# Patient Record
Sex: FEMALE | Race: WHITE | NOT HISPANIC OR LATINO | Employment: UNEMPLOYED | URBAN - METROPOLITAN AREA
[De-identification: names, ages, dates, MRNs, and addresses within clinical notes are randomized per-mention and may not be internally consistent; named-entity substitution may affect disease eponyms.]

---

## 2016-09-13 LAB
EXTERNAL ABO GROUPING: NORMAL
EXTERNAL ANTIBODY SCREEN: NEGATIVE
EXTERNAL HEPATITIS B SURFACE ANTIGEN: NEGATIVE
EXTERNAL RH FACTOR: POSITIVE
EXTERNAL RUBELLA QUALITATIVE: NORMAL
EXTERNAL SYPHILIS RPR SCREEN: NORMAL
HIV1 AB SPEC QL IA.RAPID: NEGATIVE

## 2017-01-04 ENCOUNTER — ROUTINE PRENATAL (OUTPATIENT)
Dept: OBSTETRICS AND GYNECOLOGY | Age: 37
End: 2017-01-04

## 2017-01-04 VITALS — DIASTOLIC BLOOD PRESSURE: 66 MMHG | SYSTOLIC BLOOD PRESSURE: 108 MMHG | WEIGHT: 177 LBS

## 2017-01-04 DIAGNOSIS — Z34.82 PRENATAL CARE, SUBSEQUENT PREGNANCY, SECOND TRIMESTER: Primary | ICD-10-CM

## 2017-01-04 LAB
GLUCOSE 1H P 50 G GLC PO SERPL-MCNC: 106 MG/DL (ref 65–139)
HGB BLD-MCNC: 12.1 G/DL (ref 11.9–15.5)
T4 FREE SERPL-MCNC: 0.9 NG/DL (ref 0.93–1.7)
TSH SERPL DL<=0.005 MIU/L-ACNC: 1.59 MIU/ML (ref 0.27–4.2)

## 2017-01-04 PROCEDURE — 90715 TDAP VACCINE 7 YRS/> IM: CPT | Performed by: OBSTETRICS & GYNECOLOGY

## 2017-01-04 PROCEDURE — 0502F SUBSEQUENT PRENATAL CARE: CPT | Performed by: OBSTETRICS & GYNECOLOGY

## 2017-01-04 PROCEDURE — 90471 IMMUNIZATION ADMIN: CPT | Performed by: OBSTETRICS & GYNECOLOGY

## 2017-01-04 NOTE — MR AVS SNAPSHOT
Anupama Arnold   2017 8:55 AM   Routine Prenatal    Dept Phone:  [phone]   Encounter #:  [id number]    Provider:  Jimmy Granados MD   Department:  [facility]                Your Full Care Plan              Your Updated Medication List          This list is accurate as of: 17 10:06 AM.  Always use your most recent med list.                doxylamine-pyridoxine 10-10 MG tablet delayed-release EC tablet   Commonly known as:  DICLEGIS   Take 2 tablets by mouth At Night As Needed (nausea). Can take 1 in am and at lunch prn also       prenatal vitamin 27-0.8 27-0.8 MG tablet tablet               We Performed the Following     Gestational Screen 1 Hr ([facility])     Hemoglobin     T4, Free     TSH       You Were Diagnosed With        Codes Comments    Prenatal care, subsequent pregnancy, second trimester    -  Primary ICD-10-CM: Z34.82  ICD-9-CM: V22.1       Instructions     None    Patient Instructions History      Upcoming Appointments     Visit Type Date Time Department    OB FOLLOWUP 2017  8:55 AM [facility]      [employer] Signup     [facility] [employer] allows you to send messages to your doctor, view your test results, renew your prescriptions, schedule appointments, and more. To sign up, go to [employer] and click on the Sign Up Now link in the New User? box. Enter your [employer] Activation Code exactly as it appears below along with the last four digits of your Social Security Number and your Date of Birth () to complete the sign-up process. If you do not sign up before the expiration date, you must request a new code.    [employer] Activation Code: [email or web address]  Expires: 2017 10:06 AM    If you have questions, you can email [email or web address] or call [phone] to talk to our [employer] staff. Remember, [employer] is NOT to be used for urgent needs. For medical emergencies, dial 911.               Other Info from  Your Visit           Other Notes About Your Plan     Advanced maternal age inform us sequence DNA within normal limits XX    [employer] insurance out of pocket    Sulfa allergy    Previous prematurity  on first pregnancy not in last 2    Previous large for gestational age        Allergies     Sulfa Antibiotics        Reason for Visit     Routine Prenatal Visit 1 hr gtt      Vital Signs     Blood Pressure Weight Last Menstrual Period Smoking Status          108/66 177 lb (80.3 kg) 07/20/2016 Never Smoker        Problems and Diagnoses Noted     Prenatal care    -  Primary

## 2017-01-04 NOTE — PROGRESS NOTES
[place] hemoglobin free T4 today.  Some stress with patient's mother but otherwise she is doing well

## 2017-01-05 ENCOUNTER — TELEPHONE (OUTPATIENT)
Dept: OBSTETRICS AND GYNECOLOGY | Age: 37
End: 2017-01-05

## 2017-01-05 NOTE — TELEPHONE ENCOUNTER
----- Message from Jimmy Granados MD sent at 1/5/2017 11:42 AM EST -----  These notify glucose, hemoglobin, and thyroid stimulating hormone were normal

## 2017-01-31 ENCOUNTER — TELEPHONE (OUTPATIENT)
Dept: OBSTETRICS AND GYNECOLOGY | Age: 37
End: 2017-01-31

## 2017-01-31 NOTE — TELEPHONE ENCOUNTER
Dr JACKSON pt, pt is 28 weeks preg. She has been having a normal white discharge, no smell, etc. Pt noticed last night when she was in the shower that she was having a mucus like discharge. But pt stated that she was pulling it out of her. She thinks it is her mucus plug. This is her 4th child and she has an appt tomorrow. Pt would like to know if she can just come in for her appt tomorrow. Please advise.     Pt#: [phone]   Message per [place]ita

## 2017-01-31 NOTE — TELEPHONE ENCOUNTER
Tomorrow should be fine.  Call back if she is having profuse amount of d/c (like her water broke), if there is bleeding or if she is having contraction.  Rest if able to today and push fluids.

## 2017-02-01 ENCOUNTER — ROUTINE PRENATAL (OUTPATIENT)
Dept: OBSTETRICS AND GYNECOLOGY | Age: 37
End: 2017-02-01

## 2017-02-01 VITALS — SYSTOLIC BLOOD PRESSURE: 130 MMHG | DIASTOLIC BLOOD PRESSURE: 76 MMHG | WEIGHT: 188 LBS

## 2017-02-01 DIAGNOSIS — N76.0 ACUTE VAGINITIS: Primary | ICD-10-CM

## 2017-02-01 PROCEDURE — 0502F SUBSEQUENT PRENATAL CARE: CPT | Performed by: OBSTETRICS & GYNECOLOGY

## 2017-02-01 RX ORDER — FEXOFENADINE HCL AND PSEUDOEPHEDRINE HCI 180; 240 MG/1; MG/1
1 TABLET, EXTENDED RELEASE ORAL
COMMUNITY

## 2017-02-01 RX ORDER — SALICYLIC ACID 2 %
CLEANSER (ML) TOPICAL
Refills: 11 | COMMUNITY
Start: 2016-12-27

## 2017-02-01 NOTE — PROGRESS NOTES
Reviewed glucose hemoglobin thyroid.  Cervix long thick and closed given past prematurity delivery checked a 1 swab culture plus  Recommend checking cervix next visit

## 2017-02-03 ENCOUNTER — APPOINTMENT (OUTPATIENT)
Dept: ULTRASOUND IMAGING | Facility: HOSPITAL | Age: 37
End: 2017-02-03

## 2017-02-03 ENCOUNTER — HOSPITAL ENCOUNTER (INPATIENT)
Facility: HOSPITAL | Age: 37
LOS: 7 days | Discharge: HOME | End: 2017-02-10
Attending: OBSTETRICS & GYNECOLOGY | Admitting: OBSTETRICS & GYNECOLOGY

## 2017-02-03 DIAGNOSIS — R50.82 POSTOPERATIVE FEVER: ICD-10-CM

## 2017-02-03 DIAGNOSIS — L03.311 CELLULITIS OF ABDOMINAL WALL: ICD-10-CM

## 2017-02-03 DIAGNOSIS — O21.9 NAUSEA AND VOMITING DURING PREGNANCY: ICD-10-CM

## 2017-02-03 DIAGNOSIS — O42.919 PRETERM PREMATURE RUPTURE OF MEMBRANES (PPROM) WITH UNKNOWN ONSET OF LABOR: Primary | ICD-10-CM

## 2017-02-03 PROBLEM — Z34.90 PREGNANCY: Status: ACTIVE | Noted: 2017-02-03

## 2017-02-03 LAB
ABO GROUP BLD: NORMAL
BASOPHILS # BLD AUTO: 0.01 10*3/MM3 (ref 0–0.2)
BASOPHILS NFR BLD AUTO: 0.1 % (ref 0–1.5)
BILIRUB UR QL STRIP: NEGATIVE
BLD GP AB SCN SERPL QL: NEGATIVE
CLARITY UR: CLEAR
CLUE CELLS SPEC QL WET PREP: ABNORMAL
COLOR UR: YELLOW
CRP SERPL-MCNC: 0.34 MG/DL (ref 0–0.5)
DEPRECATED RDW RBC AUTO: 44.9 FL (ref 37–54)
EOSINOPHIL # BLD AUTO: 0.1 10*3/MM3 (ref 0–0.7)
EOSINOPHIL NFR BLD AUTO: 1 % (ref 0.3–6.2)
ERYTHROCYTE [DISTWIDTH] IN BLOOD BY AUTOMATED COUNT: 13 % (ref 11.7–13)
GLUCOSE UR STRIP-MCNC: NEGATIVE MG/DL
HCT VFR BLD AUTO: 34.5 % (ref 35.6–45.5)
HGB BLD-MCNC: 11.5 G/DL (ref 11.9–15.5)
HGB UR QL STRIP.AUTO: NEGATIVE
HYDATID CYST SPEC WET PREP: ABNORMAL
IMM GRANULOCYTES # BLD: 0.07 10*3/MM3 (ref 0–0.03)
IMM GRANULOCYTES NFR BLD: 0.7 % (ref 0–0.5)
KETONES UR QL STRIP: NEGATIVE
LEUKOCYTE ESTERASE UR QL STRIP.AUTO: NEGATIVE
LYMPHOCYTES # BLD AUTO: 1.97 10*3/MM3 (ref 0.9–4.8)
LYMPHOCYTES NFR BLD AUTO: 19.6 % (ref 19.6–45.3)
MCH RBC QN AUTO: 32 PG (ref 26.9–32)
MCHC RBC AUTO-ENTMCNC: 33.3 G/DL (ref 32.4–36.3)
MCV RBC AUTO: 96.1 FL (ref 80.5–98.2)
MONOCYTES # BLD AUTO: 0.41 10*3/MM3 (ref 0.2–1.2)
MONOCYTES NFR BLD AUTO: 4.1 % (ref 5–12)
NEUTROPHILS # BLD AUTO: 7.49 10*3/MM3 (ref 1.9–8.1)
NEUTROPHILS NFR BLD AUTO: 74.5 % (ref 42.7–76)
NITRITE UR QL STRIP: NEGATIVE
PH UR STRIP.AUTO: 6.5 [PH] (ref 5–8)
PLATELET # BLD AUTO: 263 10*3/MM3 (ref 140–500)
PMV BLD AUTO: 9.5 FL (ref 6–12)
PROT UR QL STRIP: NEGATIVE
RBC # BLD AUTO: 3.59 10*6/MM3 (ref 3.9–5.2)
RH BLD: POSITIVE
SP GR UR STRIP: 1.02 (ref 1–1.03)
T VAGINALIS SPEC QL WET PREP: ABNORMAL
UROBILINOGEN UR QL STRIP: NORMAL
WBC NRBC COR # BLD: 10.05 10*3/MM3 (ref 4.5–10.7)
WBC SPEC QL WET PREP: ABNORMAL
YEAST GENITAL QL WET PREP: ABNORMAL

## 2017-02-03 PROCEDURE — 86900 BLOOD TYPING SEROLOGIC ABO: CPT

## 2017-02-03 PROCEDURE — 99222 1ST HOSP IP/OBS MODERATE 55: CPT | Performed by: OBSTETRICS & GYNECOLOGY

## 2017-02-03 PROCEDURE — 86850 RBC ANTIBODY SCREEN: CPT

## 2017-02-03 PROCEDURE — 87186 SC STD MICRODIL/AGAR DIL: CPT | Performed by: OBSTETRICS & GYNECOLOGY

## 2017-02-03 PROCEDURE — 25010000002 MAGNESIUM SULFATE 40 GM/1000ML SOLUTION

## 2017-02-03 PROCEDURE — 76805 OB US >/= 14 WKS SNGL FETUS: CPT

## 2017-02-03 PROCEDURE — 86901 BLOOD TYPING SEROLOGIC RH(D): CPT

## 2017-02-03 PROCEDURE — 87075 CULTR BACTERIA EXCEPT BLOOD: CPT | Performed by: OBSTETRICS & GYNECOLOGY

## 2017-02-03 PROCEDURE — 87205 SMEAR GRAM STAIN: CPT | Performed by: OBSTETRICS & GYNECOLOGY

## 2017-02-03 PROCEDURE — 87210 SMEAR WET MOUNT SALINE/INK: CPT | Performed by: OBSTETRICS & GYNECOLOGY

## 2017-02-03 PROCEDURE — 87081 CULTURE SCREEN ONLY: CPT | Performed by: OBSTETRICS & GYNECOLOGY

## 2017-02-03 PROCEDURE — 87109 MYCOPLASMA: CPT | Performed by: OBSTETRICS & GYNECOLOGY

## 2017-02-03 PROCEDURE — 81003 URINALYSIS AUTO W/O SCOPE: CPT | Performed by: OBSTETRICS & GYNECOLOGY

## 2017-02-03 PROCEDURE — 87147 CULTURE TYPE IMMUNOLOGIC: CPT | Performed by: OBSTETRICS & GYNECOLOGY

## 2017-02-03 PROCEDURE — 25010000002 ONDANSETRON PER 1 MG: Performed by: OBSTETRICS & GYNECOLOGY

## 2017-02-03 PROCEDURE — 87070 CULTURE OTHR SPECIMN AEROBIC: CPT | Performed by: OBSTETRICS & GYNECOLOGY

## 2017-02-03 PROCEDURE — 25010000002 BETAMETHASONE ACET & SOD PHOS PER 4 MG: Performed by: OBSTETRICS & GYNECOLOGY

## 2017-02-03 PROCEDURE — 86140 C-REACTIVE PROTEIN: CPT | Performed by: OBSTETRICS & GYNECOLOGY

## 2017-02-03 PROCEDURE — 85025 COMPLETE CBC W/AUTO DIFF WBC: CPT | Performed by: OBSTETRICS & GYNECOLOGY

## 2017-02-03 RX ORDER — DOCUSATE SODIUM 100 MG/1
100 CAPSULE, LIQUID FILLED ORAL 2 TIMES DAILY
Status: DISCONTINUED | OUTPATIENT
Start: 2017-02-03 | End: 2017-02-06

## 2017-02-03 RX ORDER — PRENATAL VIT NO.126/IRON/FOLIC 28MG-0.8MG
1 TABLET ORAL DAILY
Status: DISCONTINUED | OUTPATIENT
Start: 2017-02-03 | End: 2017-02-06

## 2017-02-03 RX ORDER — MAGNESIUM SULFATE HEPTAHYDRATE 40 MG/ML
2 INJECTION, SOLUTION INTRAVENOUS CONTINUOUS
Status: DISCONTINUED | OUTPATIENT
Start: 2017-02-03 | End: 2017-02-05

## 2017-02-03 RX ORDER — BETAMETHASONE SODIUM PHOSPHATE AND BETAMETHASONE ACETATE 3; 3 MG/ML; MG/ML
12 INJECTION, SUSPENSION INTRA-ARTICULAR; INTRALESIONAL; INTRAMUSCULAR; SOFT TISSUE EVERY 24 HOURS
Status: DISCONTINUED | OUTPATIENT
Start: 2017-02-03 | End: 2017-02-03

## 2017-02-03 RX ORDER — ONDANSETRON 2 MG/ML
4 INJECTION INTRAMUSCULAR; INTRAVENOUS EVERY 8 HOURS PRN
Status: DISCONTINUED | OUTPATIENT
Start: 2017-02-03 | End: 2017-02-06

## 2017-02-03 RX ORDER — SODIUM CHLORIDE, SODIUM LACTATE, POTASSIUM CHLORIDE, CALCIUM CHLORIDE 600; 310; 30; 20 MG/100ML; MG/100ML; MG/100ML; MG/100ML
100 INJECTION, SOLUTION INTRAVENOUS CONTINUOUS
Status: DISCONTINUED | OUTPATIENT
Start: 2017-02-03 | End: 2017-02-05

## 2017-02-03 RX ORDER — SODIUM CHLORIDE, SODIUM LACTATE, POTASSIUM CHLORIDE, CALCIUM CHLORIDE 600; 310; 30; 20 MG/100ML; MG/100ML; MG/100ML; MG/100ML
125 INJECTION, SOLUTION INTRAVENOUS CONTINUOUS
Status: DISCONTINUED | OUTPATIENT
Start: 2017-02-03 | End: 2017-02-05

## 2017-02-03 RX ORDER — PROMETHAZINE HYDROCHLORIDE 25 MG/ML
25 INJECTION, SOLUTION INTRAMUSCULAR; INTRAVENOUS EVERY 6 HOURS PRN
Status: DISCONTINUED | OUTPATIENT
Start: 2017-02-03 | End: 2017-02-06

## 2017-02-03 RX ORDER — MAGNESIUM SULFATE HEPTAHYDRATE 40 MG/ML
INJECTION, SOLUTION INTRAVENOUS
Status: COMPLETED
Start: 2017-02-03 | End: 2017-02-03

## 2017-02-03 RX ORDER — LIDOCAINE HYDROCHLORIDE 10 MG/ML
5 INJECTION, SOLUTION INFILTRATION; PERINEURAL AS NEEDED
Status: DISCONTINUED | OUTPATIENT
Start: 2017-02-03 | End: 2017-02-06

## 2017-02-03 RX ORDER — DIPHENHYDRAMINE HCL 25 MG
25 CAPSULE ORAL EVERY 6 HOURS PRN
Status: DISCONTINUED | OUTPATIENT
Start: 2017-02-03 | End: 2017-02-06

## 2017-02-03 RX ORDER — BISACODYL 10 MG
10 SUPPOSITORY, RECTAL RECTAL DAILY PRN
Status: DISCONTINUED | OUTPATIENT
Start: 2017-02-03 | End: 2017-02-06

## 2017-02-03 RX ORDER — SODIUM CHLORIDE 0.9 % (FLUSH) 0.9 %
1-10 SYRINGE (ML) INJECTION AS NEEDED
Status: DISCONTINUED | OUTPATIENT
Start: 2017-02-03 | End: 2017-02-06

## 2017-02-03 RX ORDER — SODIUM CHLORIDE 0.9 % (FLUSH) 0.9 %
1-10 SYRINGE (ML) INJECTION AS NEEDED
Status: DISCONTINUED | OUTPATIENT
Start: 2017-02-03 | End: 2017-02-05

## 2017-02-03 RX ORDER — LIDOCAINE HYDROCHLORIDE 10 MG/ML
5 INJECTION, SOLUTION INFILTRATION; PERINEURAL AS NEEDED
Status: DISCONTINUED | OUTPATIENT
Start: 2017-02-03 | End: 2017-02-05

## 2017-02-03 RX ORDER — ACETAMINOPHEN 325 MG/1
650 TABLET ORAL EVERY 4 HOURS PRN
Status: DISCONTINUED | OUTPATIENT
Start: 2017-02-03 | End: 2017-02-04 | Stop reason: SDUPTHER

## 2017-02-03 RX ORDER — ZOLPIDEM TARTRATE 5 MG/1
5 TABLET ORAL NIGHTLY PRN
Status: DISCONTINUED | OUTPATIENT
Start: 2017-02-03 | End: 2017-02-06

## 2017-02-03 RX ORDER — ACETAMINOPHEN 325 MG/1
650 TABLET ORAL EVERY 4 HOURS PRN
Status: DISCONTINUED | OUTPATIENT
Start: 2017-02-03 | End: 2017-02-06

## 2017-02-03 RX ORDER — BETAMETHASONE SODIUM PHOSPHATE AND BETAMETHASONE ACETATE 3; 3 MG/ML; MG/ML
12 INJECTION, SUSPENSION INTRA-ARTICULAR; INTRALESIONAL; INTRAMUSCULAR; SOFT TISSUE DAILY
Status: COMPLETED | OUTPATIENT
Start: 2017-02-03 | End: 2017-02-04

## 2017-02-03 RX ADMIN — ACETAMINOPHEN 650 MG: 325 TABLET ORAL at 14:52

## 2017-02-03 RX ADMIN — AMPICILLIN SODIUM 2 G: 2 INJECTION, POWDER, FOR SOLUTION INTRAMUSCULAR; INTRAVENOUS at 10:59

## 2017-02-03 RX ADMIN — AMPICILLIN SODIUM 2 G: 2 INJECTION, POWDER, FOR SOLUTION INTRAMUSCULAR; INTRAVENOUS at 16:57

## 2017-02-03 RX ADMIN — MAGNESIUM SULFATE HEPTAHYDRATE 2 G/HR: 40 INJECTION, SOLUTION INTRAVENOUS at 10:52

## 2017-02-03 RX ADMIN — Medication 1 TABLET: at 15:17

## 2017-02-03 RX ADMIN — ONDANSETRON 4 MG: 2 INJECTION INTRAMUSCULAR; INTRAVENOUS at 10:54

## 2017-02-03 RX ADMIN — ACETAMINOPHEN 650 MG: 325 TABLET ORAL at 20:53

## 2017-02-03 RX ADMIN — AMPICILLIN SODIUM 2 G: 2 INJECTION, POWDER, FOR SOLUTION INTRAMUSCULAR; INTRAVENOUS at 22:45

## 2017-02-03 RX ADMIN — AZITHROMYCIN DIHYDRATE 500 MG: 500 INJECTION, POWDER, LYOPHILIZED, FOR SOLUTION INTRAVENOUS at 11:59

## 2017-02-03 RX ADMIN — BETAMETHASONE SODIUM PHOSPHATE AND BETAMETHASONE ACETATE 12 MG: 3; 3 INJECTION, SUSPENSION INTRA-ARTICULAR; INTRALESIONAL; INTRAMUSCULAR at 09:57

## 2017-02-03 RX ADMIN — SODIUM CHLORIDE, POTASSIUM CHLORIDE, SODIUM LACTATE AND CALCIUM CHLORIDE 125 ML/HR: 600; 310; 30; 20 INJECTION, SOLUTION INTRAVENOUS at 09:42

## 2017-02-03 RX ADMIN — ONDANSETRON 4 MG: 2 INJECTION INTRAMUSCULAR; INTRAVENOUS at 22:45

## 2017-02-03 NOTE — H&P
[facility]  Obstetric History and Physical    No chief complaint on file.      Subjective     Patient is a 36 y.o. female  currently at 28w2d, who presents with rupture of membranes.  Patient was sleeping this morning and she noticed a gush of fluid and is felt some pelvic pressure but no regular contractions.  She was seen in the office 2 days ago for increased discharge and her cervix was found to be closed.  Patient notices good fetal movement.  She has a history of one  delivery at 36 weeks 6 days baby weighed 6 lbs. 11 oz. baby did require NICU admission.  She denies any dysuria or vaginal bleeding.    Her prenatal care is complicated by  advanced maternal age  genetic screening was normal.  Patient had normal first trimester cell free DNA testing.  Her previous obstetric/gynecological history is noted for  delivery at 36-6/7 weeks and macrosomia with her other 2 children.    The following portions of the patients history were reviewed and updated as appropriate: current medications, allergies, past medical history, past surgical history, past family history, past social history and problem list .       Prenatal Information:  Prenatal Results         1st Trimester Ref. Range Date Time   CBC with auto diff       Rubella IgG       Hepatitis B SAg  Negative  Negative 16 1356   RPR  Non Reactive  Non Reactive 16 1356   ABO  A   16 1356   Rh  Positive   16 1356   Anibody Screen  Negative  Negative 16 1356   HIV       Varicella IgG       Urinalysis with microscopy       Urine Culture       GC/Chlamydia/TV       ThinPrep/Pap       2nd and 3rd Trimester Ref. Range Date Time   Hemoglobin / Hematocrit  34.5 % (L) 35.6 - 45.5 % 17 0941   Hemoglobin  11.5 g/dL (L) 11.9 - 15.5 g/dL 17 0941   Group B Strep Culture       Glucose Challege Test 1 hour       Glucose Tolerance Test 3 hours       Pre-eclampsia Panel       Risk Screening Ref. Range Date Time   Fetal  Fibronectin       Amnisure       Hepatitis C Antibody  <0.1 s/co ratio 0.0 - 0.9 s/co ratio 16 1356   Hemoglobin electrophoresis       Cystic Fibrosis       Hemoglobin A1C       MSAFP - 4       NIPT       AFP       Parvovirus IgG       Parvovirus IgM       POCT - glucose       [facility]       24 Hour urine - Total protein       24 Hour urine - Creatinine clearance       Urinalysis with microscopy       Urine Culture       Drug Screening Ref. Range Date Time   Amphetamine Screen       Barbiturate Screen       Benzodiazepine Screen       Methadone Screen       Phencyclidine Screen       Opiates Screen       THC Screen       Cocaine Screen       Amphetamine Screen       Propoxyphene Screen       Buprenorphine Screen       Methamphetamine Screen       Oxycodone Screen       Tryicyclic Antidepressants Screen              Legend: ^: Historical            View all results for this pregnancy             Past OB History:     Obstetric History       T2      TAB0   SAB0   E0   M0   L3       # Outcome Date GA Lbr Neel/2nd Weight Sex Delivery Anes PTL Lv   4 Current            3 Term    8 lb 8 oz (3856 g)  Vag-Spont   Y   2 Term    8 lb 8 oz (3856 g)  Vag-Spont   Y   1     6 lb 11 oz (3033 g)  Vag-Spont   Y          Past Medical History: Past Medical History   Diagnosis Date   • Ovarian cyst    • Pelvic pain       Past Surgical History No past surgical history on file.   Family History: Family History   Problem Relation Age of Onset   • Hypertension Paternal Grandfather       Social History:  reports that she has never smoked. She does not have any smokeless tobacco history on file.   reports that she does not drink alcohol.   reports that she does not use illicit drugs.        General ROS: Pertinent items are noted in HPI, all other systems reviewed and negative    Objective   Visit Vitals   • LMP 2016         Vital Signs Range for the last 24 hours  Temperature:     Temp Source:      BP:     Pulse:     Respirations:     SPO2:     O2 Amount (l/min):     O2 Devices     Weight:       Physical Examination: General appearance - anxious  Mental status - anxious  Chest - clear to auscultation, no wheezes, rales or rhonchi, symmetric air entry  Heart - normal rate and regular rhythm  Abdomen - soft, nontender, nondistended, no masses or organomegaly  Pelvic - speculum exam is done group B strep, GC chlamydia, mycoplasma and Ureaplasma, and anaerobic cultures are obtained.  There is a pool of clear fluid in the vagina no obvious or odor.  Cervix appears closed and posterior  Extremities - no pedal edema noted  Skin - normal coloration and turgor, no rashes, no suspicious skin lesions noted    Presentation:  transverse    Cervix: Exam by:   visually closed on sterile speculum exam    Dilation:     Effacement:     Station:         Fetal Heart Rate Assessment   Method: Fetal HR Assessment Method: external   Beats/min:     Baseline: Fetal HR Baseline: normal range (110-160 bpm)   Varibility: Fetal HR Variability: moderate (amplitude range 6 to 25 bpm)   Accels: Fetal HR Accelerations: lasting at least 15 seconds   Decels: Fetal HR Decelerations: variable   Tracing Category: Fetal HR Tracing Category: Category I     Uterine Assessment   Method: Method: TOCO (external toco transducer)   Frequency (min):     Ctx Count in 10 min: Contraction Frequency in 10min: 0   Duration:     Intensity: Contraction Intensity: no contractions   Intensity by IUPC:     Resting Tone: Uterine Resting Tone: soft by palpation   Resting Tone by IUPC:     [place] Units:       Laboratory Results.:     Results from last 7 days  Lab Units 17  0941   WBC 10*3/mm3 10.05   HEMOGLOBIN g/dL 11.5*   HEMATOCRIT % 34.5*   PLATELETS 10*3/mm3 263           Assessment/Plan     Active Problems:    Pregnancy     premature rupture of membranes (PPROM) with unknown onset of labor        Assessment:  1.  Intrauterine pregnancy at 28w2d  weeks gestation with reassuring fetal status.    2.  PPROM  without chorioamnionitis and malpresentation  transverse lie  3.  Obstetrical history significant for 36 week delivery and AMA.  4.  GBS status: Unknown  Plan:  1. fetal and uterine monitoring  continuously, tocolysis with magnesium sulfate, IV antibiotics, IV steroids for fetal benefit, Fetal Ultrasound for Fetal weight,  Consultation and expectant management  2. Plan of care has been reviewed with patient and   3.  Risks, benefits of treatment plan have been discussed.  4.  All questions have been answered.    I discussed the patient with Dr. Mcmullen after his ultrasound and consultation.  Baby is in a andi breech presentation.  Weight is 3 lbs. 0 oz. at the 54th percentile.  Placenta is anterior but the lower segment is clear.  Plan is to continue antibiotics and magnesium.  Repeat steroids will be given tomorrow.  Normal white blood cell count and CRP are reassuring.  No clinical signs of chorioamnionitis currently.    Yeyo Claudio MD  2/3/2017  10:38 AM

## 2017-02-03 NOTE — CONSULTS
36  at 28 2/7 per GOSIA 17    Pt reports feeling poorly the past 2-3 days  PPROM this AM. No regular ctx.    Prenatal care is significant for:  Hx delivery at 36 6/7 weeks  cfDNA screen negative    PMH  Ob      Ills  Ovarian cyst, pelvic pain    Alls  Sulfa: swelling  Tape; rash    Ops  None    Meds    Sh  Pt is RN    Fh  Neg for MR T21 CHD ONTD CF    Px  Vitals:    17 1157 17 1159 17 1201 17 1203   BP:   (!) 69/60 105/68   Pulse: 84 83 83 94   SpO2: 99% 100% 100% 100%     General appearance:  A&Ox3 pleasant tearful WF INAD appropriately nourished in early third trimester, not in labor    Abdomen:  Fundal height is appropriate for gestational age  Uterus is nontender    Extrem:  DTR 3+ at rt knee    EFM  Baseline 140 initially  Variability moderate  Accels present  Decels absent  Ctx none documented despite well positioned toco      WBC 10.0    CRP 0.34    Plt 263  UA negative    See separate US reeport    A:  28 2/7  PPROM  Hx delivery at 36 6/7  Jair Breech  Oligohydramnios  No sign of overt intraamniotic infection at present      P:  BMZ  Magnesium neuroprotection 6g load followed by 2 g/hr at least thru this evening.  Reinitiate magnesium neuroprotection when delivery is expected within 6-12 hours.  Abx: Azith, penicillin  SCDs while in bed      Recommendations:    Weekly or twice weekly BPP    Twice daily EFM with continuous EFM as indicated by variable decels or contractions.     If delivery is expected within 6-12 hours, reinitiate magnesium sulfate neuroprotection, 6 gram load, 2 grams per hour.     Indications for delivery include (but are not limited to):    Signs of intraamniotic infection; uterine tenderness, contractions.    Abnormal fetal status, eg > 2 clinically significant variable decelerations x 2 hours      Regarding plans for eventual  delivery:     Vertical or classical  delivery may be required to facilitate gentle delivery of the after-coming  head.       Incision of the (anterior) placenta should be avoided to prevent fetal/ hypovolemia.        The placenta is anterior.  The inferior placental margin is at the midportion of the anterior uterus.      In creation of the vertical hysterotomy, when the placenta is reached, it should be bluntly  as needed from the attached myometrium to facilitate creating an adequate hysterotomy incision.    Floor time 80 min

## 2017-02-04 PROCEDURE — 25010000002 ONDANSETRON PER 1 MG: Performed by: OBSTETRICS & GYNECOLOGY

## 2017-02-04 PROCEDURE — 25010000002 BETAMETHASONE ACET & SOD PHOS PER 4 MG: Performed by: OBSTETRICS & GYNECOLOGY

## 2017-02-04 PROCEDURE — 25010000002 MAGNESIUM SULFATE 40 GM/1000ML SOLUTION: Performed by: OBSTETRICS & GYNECOLOGY

## 2017-02-04 PROCEDURE — 99231 SBSQ HOSP IP/OBS SF/LOW 25: CPT | Performed by: OBSTETRICS & GYNECOLOGY

## 2017-02-04 RX ADMIN — ACETAMINOPHEN 650 MG: 325 TABLET ORAL at 16:00

## 2017-02-04 RX ADMIN — ACETAMINOPHEN 650 MG: 325 TABLET ORAL at 09:08

## 2017-02-04 RX ADMIN — Medication 1 TABLET: at 10:04

## 2017-02-04 RX ADMIN — AMPICILLIN SODIUM 2 G: 2 INJECTION, POWDER, FOR SOLUTION INTRAMUSCULAR; INTRAVENOUS at 11:12

## 2017-02-04 RX ADMIN — ACETAMINOPHEN 650 MG: 325 TABLET ORAL at 20:28

## 2017-02-04 RX ADMIN — ONDANSETRON 4 MG: 2 INJECTION INTRAMUSCULAR; INTRAVENOUS at 15:52

## 2017-02-04 RX ADMIN — AMPICILLIN SODIUM 2 G: 2 INJECTION, POWDER, FOR SOLUTION INTRAMUSCULAR; INTRAVENOUS at 23:09

## 2017-02-04 RX ADMIN — AMPICILLIN SODIUM 2 G: 2 INJECTION, POWDER, FOR SOLUTION INTRAMUSCULAR; INTRAVENOUS at 17:07

## 2017-02-04 RX ADMIN — ACETAMINOPHEN 650 MG: 325 TABLET ORAL at 01:04

## 2017-02-04 RX ADMIN — ONDANSETRON 4 MG: 2 INJECTION INTRAMUSCULAR; INTRAVENOUS at 08:14

## 2017-02-04 RX ADMIN — BETAMETHASONE SODIUM PHOSPHATE AND BETAMETHASONE ACETATE 12 MG: 3; 3 INJECTION, SUSPENSION INTRA-ARTICULAR; INTRALESIONAL; INTRAMUSCULAR at 10:04

## 2017-02-04 RX ADMIN — MAGNESIUM SULFATE HEPTAHYDRATE 2 G/HR: 40 INJECTION, SOLUTION INTRAVENOUS at 03:06

## 2017-02-04 RX ADMIN — SODIUM CHLORIDE, POTASSIUM CHLORIDE, SODIUM LACTATE AND CALCIUM CHLORIDE 75 ML/HR: 600; 310; 30; 20 INJECTION, SOLUTION INTRAVENOUS at 21:03

## 2017-02-04 RX ADMIN — AMPICILLIN SODIUM 2 G: 2 INJECTION, POWDER, FOR SOLUTION INTRAMUSCULAR; INTRAVENOUS at 05:04

## 2017-02-04 RX ADMIN — AZITHROMYCIN DIHYDRATE 500 MG: 500 INJECTION, POWDER, LYOPHILIZED, FOR SOLUTION INTRAVENOUS at 12:07

## 2017-02-04 RX ADMIN — ACETAMINOPHEN 650 MG: 325 TABLET ORAL at 01:01

## 2017-02-04 RX ADMIN — SODIUM CHLORIDE, POTASSIUM CHLORIDE, SODIUM LACTATE AND CALCIUM CHLORIDE 100 ML/HR: 600; 310; 30; 20 INJECTION, SOLUTION INTRAVENOUS at 03:05

## 2017-02-04 RX ADMIN — DOCUSATE SODIUM 100 MG: 100 CAPSULE, LIQUID FILLED ORAL at 09:03

## 2017-02-04 RX ADMIN — MAGNESIUM SULFATE HEPTAHYDRATE 2 G/HR: 40 INJECTION, SOLUTION INTRAVENOUS at 22:39

## 2017-02-04 NOTE — PROGRESS NOTES
Pt is comfortable on Magnesium. +FM    FHTS- positive accels  [place]- no contractions    PPROM 28 weeks- Continue Magnesium    Yeyo Claudio MD

## 2017-02-04 NOTE — PROGRESS NOTES
.  ANTEPARTUM ROUNDING NOTE     Admission date 2/3/2017     Patient: Anupama Arnold MRN: [id number]   YOB: 1980 Age: 36 y.o. Sex: female     SUBJECTIVE:     Anupama Arnold is a 36 y.o.,  AT 28w3d admitted for PPROM. Denies vaginal bleeding or contractions. Admits to good fetal movement. Patient denies any abdominal pain. She has mild nausea which has been present throughout pregnancy.    OBJECTIVE:     Vitals:   Vitals:    17 0603 17 0700 17 0800 17 0900   BP: 98/55 102/58 116/62 103/58   BP Location:       Patient Position:       Pulse: 82 91 87 92   Resp: 16 16 16 16   Temp:  98.5 °F (36.9 °C)  97.8 °F (36.6 °C)   TempSrc:  Oral  Oral   SpO2:  96%         Intake/Output:   Intake/Output Summary (Last 24 hours) at 17  Last data filed at 17 09   Gross per 24 hour   Intake 3182.32 ml   Output   5075 ml   Net -1892.68 ml        Lab Results (last 7 days)     Procedure Component Value Units Date/Time    CBC & Differential [[id number]] Collected:  17    Specimen:  Blood Updated:  17    Narrative:       The following orders were created for panel order CBC & Differential.  Procedure                               Abnormality         Status                     ---------                               -----------         ------                     CBC Auto Differential[[id number]]         Abnormal            Final result                 Please view results for these tests on the individual orders.    CBC Auto Differential [[id number]]  (Abnormal) Collected:  17    Specimen:  Blood Updated:  17 100     WBC 10.05 10*3/mm3      RBC 3.59 (L) 10*6/mm3      Hemoglobin 11.5 (L) g/dL      Hematocrit 34.5 (L) %      MCV 96.1 fL      MCH 32.0 pg      MCHC 33.3 g/dL      RDW 13.0 %      RDW-SD 44.9 fl      MPV 9.5 fL      Platelets 263 10*3/mm3      Neutrophil % 74.5 %      Lymphocyte % 19.6 %      Monocyte % 4.1 (L) %      Eosinophil % 1.0 %       Basophil % 0.1 %      Immature Grans % 0.7 (H) %      Neutrophils, Absolute 7.49 10*3/mm3      Lymphocytes, Absolute 1.97 10*3/mm3      Monocytes, Absolute 0.41 10*3/mm3      Eosinophils, Absolute 0.10 10*3/mm3      Basophils, Absolute 0.01 10*3/mm3      Immature Grans, Absolute 0.07 (H) 10*3/mm3     Anaerobic Culture [[id number]] Collected:  02/03/17 1038    Specimen:  Swab from Vagina Updated:  02/03/17 1108    Mycoplasma / Ureaplasma Culture [[id number]] Collected:  02/03/17 1038    Specimen:  Swab from Endocervix Updated:  02/03/17 1108    Urinalysis With / Culture If Indicated [[id number]]  (Normal) Collected:  02/03/17 1104    Specimen:  Urine from Urine, Catheter Updated:  02/03/17 1124     Color, UA Yellow      Appearance, UA Clear      pH, UA 6.5      Specific Gravity, UA 1.020      Glucose, UA Negative      Ketones, UA Negative      Bilirubin, UA Negative      Blood, UA Negative      Protein, UA Negative      Leuk Esterase, UA Negative      Nitrite, UA Negative      Urobilinogen, UA 0.2 E.U./dL     Narrative:       Urine microscopic not indicated.    Wet Prep, Genital [[id number]]  (Abnormal) Collected:  02/03/17 1038    Specimen:  Swab from Vagina Updated:  02/03/17 1131     YEAST No yeast seen      HYPHAL ELEMENTS No Hyphal elements seen      WBC'S 1+ WBC's seen (A)      Clue Cells, Wet Prep No Clue cells seen      Trichomonas, Wet Prep No Trichomonas seen     C-reactive Protein [[id number]]  (Normal) Collected:  02/03/17 1015    Specimen:  Blood Updated:  02/03/17 1143     C-Reactive Protein 0.34 mg/dL     Hepatitis B Surface Antigen [[id number]] Resulted:  09/13/16     Specimen:  Blood Updated:  02/03/17 2151     External Hepatitis B Surface Ag Negative     RPR [[id number]] Resulted:  09/13/16     Specimen:  Blood Updated:  02/03/17 2151     External RPR Non-Reactive     Rubella Antibody, IgG [[id number]] Resulted:  09/13/16     Specimen:  Blood Updated:  02/03/17 2151     External Rubella Qual Immune     HIV-1  Antibody, EIA [[id number]] Resulted:  16     Specimen:  Blood Updated:  17 2151     External HIV-1 Antibody Negative     Wound Culture [[id number]]  (Abnormal) Collected:  17 1155    Specimen:  Wound from Vagina Updated:  17 0751     Wound Culture Rare Proteus species (A)      Gram Stain Result Rare (1+) WBCs seen       Rare (1+) Gram positive bacilli     Group B Strep Culture [[id number]]  (Normal) Collected:  17 1015    Specimen:  Swab from Vaginal/Rectum Updated:  17 0812     Culture No Group B Streptococcus Isolated at 24 hrs           Appearance/Psychiatric: In no distress   Constitutional: The patient is well nourished   Cardiovascular: She does not have edema. Heart RRR, no murmurs/rubs/gallops   Respiratory: Respiratory effort is normal. CTAB   Abdomen: Soft, gravid, and nontender   Ext: nontender, no edema, no cords    FHT's Reassuring and appropriate for gestational age    ASSESSMENT AND PLAN:   Patient Active Problem List    Diagnosis   • Pregnancy [Z33.1]   •  premature rupture of membranes (PPROM) with unknown onset of labor [O42.919]   • Advanced maternal age in multigravida [O09.529]   • History of macrosomia in infant in prior pregnancy, currently pregnant [O09.299]      Patient currently on magnesium for tocolysis, will continue until she receives second dose of BMS. Then plan is to re-start magnesium for neuroprotection if delivery is anticipated  Pt is on antibiotics for PPROM currently, no signs of infection now  Breech presentation on last u/s so  likely for delivery, pt understands risks of vertical uterine incision given prematurity/presentation     LOS: 1 day    Elvira Butcher MD   2017

## 2017-02-04 NOTE — PROGRESS NOTES
"[facility]  Obstetric Daily MFM Progress Note    Subjective    Patient is 36 y.o. at 28w3d on  LOS: 1 day .  The patient feels well. Patient reports no new problems. Patient denies contractions.      Objective   Vital Signs Range for the last 24 hours  Temp:  [97.7 °F (36.5 °C)-98.7 °F (37.1 °C)] 98.2 °F (36.8 °C)   BP: ()/(45-72) 92/48   Heart Rate:  [] 98   Resp:  [15-18] 16         Flowsheet Rows         First Filed Value    Admission Height  65\" (165.1 cm) Documented at 2017 0900    Admission Weight            Intake/Output last 24 hours:     Intake/Output Summary (Last 24 hours) at 17 1341  Last data filed at 17 1307   Gross per 24 hour   Intake 3182.32 ml   Output   4625 ml   Net -1442.68 ml     Intake/Output this shift:   I/O this shift:  In: -   Out: 950 [Urine:950]    Physical Exam:  General: Patient is comfortable         Fetal Heart Rate Assessment   Method: Fetal HR Assessment Method: external   Beats/min: Fetal HR (Beats/Min): 135   Baseline: Fetal HR Baseline: normal range (110-160 bpm)   Varibility: Fetal HR Variability: moderate (amplitude range 6 to 25 bpm)   Accels: Fetal HR Accelerations: greater than/equal to10 bpm (32 wks gest or less), lasts at least 10 seconds (32 wks gest or less)   Decels: Fetal HR Decelerations: absent     Uterine Assessment   Method: Method: TOCO (external toco transducer)   Frequency (min):     Ctx Count in 10 min: Contraction Frequency in 10min: 2   Duration: Contraction Duration (sec): 50-60   Intensity: Contraction Intensity: no contractions           Invalid input(s): LABALBU, PROT, BILITO,  POCGLU     Results from last 7 days  Lab Units 17  0941   WBC 10*3/mm3 10.05   HEMOGLOBIN g/dL 11.5*   HEMATOCRIT % 34.5*   PLATELETS 10*3/mm3 263        Lab Results   Component Value Date    CULTURE No Group B Streptococcus Isolated at 24 hrs 2017       Assessment/Plan   Active Problems:    Pregnancy     premature rupture of " membranes (PPROM) with unknown onset of labor      ASSESSMENT:  1.  36 y.o. with intrauterine pregnancy at 28w3d weeks on  LOS: 1 day .  2.  PPROM, Breech, AMA    RECOMMENDATIONS/PLAN:  1. Continue IV latency antibiotics and transition to oral after 48 hours, complete BMZ, twice daily NST, and daily FM monitoring.  Inpatient care until delivery.  2. Plan of care has been reviewed with patient.  3.  Risks, benefits of treatment plan have been discussed.  4.  All questions have been answered.    30 minutes spent on the floor and/or with the patient providing assessments and recommendations for her medical/obstetric care.    Stanley Patricia MD  2/4/2017  1:41 PM

## 2017-02-04 NOTE — PLAN OF CARE
Problem: Patient Care Overview (Adult)  Goal: Plan of Care Review  Outcome: Ongoing (interventions implemented as appropriate)    17 1822   Coping/Psychosocial Response Interventions   Plan Of Care Reviewed With patient   Patient Care Overview   Progress no change       Goal: Adult Individualization and Mutuality  Outcome: Ongoing (interventions implemented as appropriate)  Goal: Discharge Needs Assessment  Outcome: Outcome(s) achieved Date Met:  17    Problem:  Labor (Adult,Obstetrics,Pediatric)  Goal: Signs and Symptoms of Listed Potential Problems Will be Absent or Manageable ( Labor)  Outcome: Ongoing (interventions implemented as appropriate)

## 2017-02-05 LAB
BASOPHILS # BLD AUTO: 0 10*3/MM3 (ref 0–0.2)
BASOPHILS NFR BLD AUTO: 0 % (ref 0–1.5)
CRP SERPL-MCNC: 5.27 MG/DL (ref 0–0.5)
DEPRECATED RDW RBC AUTO: 48.2 FL (ref 37–54)
EOSINOPHIL # BLD AUTO: 0.02 10*3/MM3 (ref 0–0.7)
EOSINOPHIL NFR BLD AUTO: 0.1 % (ref 0.3–6.2)
ERYTHROCYTE [DISTWIDTH] IN BLOOD BY AUTOMATED COUNT: 13.4 % (ref 11.7–13)
HCT VFR BLD AUTO: 30.3 % (ref 35.6–45.5)
HGB BLD-MCNC: 10.2 G/DL (ref 11.9–15.5)
IMM GRANULOCYTES # BLD: 0.06 10*3/MM3 (ref 0–0.03)
IMM GRANULOCYTES NFR BLD: 0.4 % (ref 0–0.5)
LYMPHOCYTES # BLD AUTO: 1.1 10*3/MM3 (ref 0.9–4.8)
LYMPHOCYTES NFR BLD AUTO: 7.1 % (ref 19.6–45.3)
MCH RBC QN AUTO: 33.3 PG (ref 26.9–32)
MCHC RBC AUTO-ENTMCNC: 33.7 G/DL (ref 32.4–36.3)
MCV RBC AUTO: 99 FL (ref 80.5–98.2)
MONOCYTES # BLD AUTO: 0.83 10*3/MM3 (ref 0.2–1.2)
MONOCYTES NFR BLD AUTO: 5.3 % (ref 5–12)
NEUTROPHILS # BLD AUTO: 13.54 10*3/MM3 (ref 1.9–8.1)
NEUTROPHILS NFR BLD AUTO: 87.1 % (ref 42.7–76)
PLATELET # BLD AUTO: 244 10*3/MM3 (ref 140–500)
PMV BLD AUTO: 9.5 FL (ref 6–12)
RBC # BLD AUTO: 3.06 10*6/MM3 (ref 3.9–5.2)
WBC NRBC COR # BLD: 15.55 10*3/MM3 (ref 4.5–10.7)

## 2017-02-05 PROCEDURE — 25010000002 ONDANSETRON PER 1 MG: Performed by: OBSTETRICS & GYNECOLOGY

## 2017-02-05 PROCEDURE — 86140 C-REACTIVE PROTEIN: CPT | Performed by: OBSTETRICS & GYNECOLOGY

## 2017-02-05 PROCEDURE — 85025 COMPLETE CBC W/AUTO DIFF WBC: CPT | Performed by: OBSTETRICS & GYNECOLOGY

## 2017-02-05 PROCEDURE — 99231 SBSQ HOSP IP/OBS SF/LOW 25: CPT | Performed by: OBSTETRICS & GYNECOLOGY

## 2017-02-05 PROCEDURE — 59025 FETAL NON-STRESS TEST: CPT | Performed by: OBSTETRICS & GYNECOLOGY

## 2017-02-05 RX ORDER — ONDANSETRON 4 MG/1
4 TABLET, FILM COATED ORAL EVERY 6 HOURS PRN
Status: DISCONTINUED | OUTPATIENT
Start: 2017-02-05 | End: 2017-02-06

## 2017-02-05 RX ORDER — AMOXICILLIN 500 MG/1
500 CAPSULE ORAL EVERY 8 HOURS SCHEDULED
Status: DISCONTINUED | OUTPATIENT
Start: 2017-02-05 | End: 2017-02-06

## 2017-02-05 RX ADMIN — ACETAMINOPHEN 650 MG: 325 TABLET ORAL at 19:33

## 2017-02-05 RX ADMIN — ONDANSETRON 4 MG: 2 INJECTION INTRAMUSCULAR; INTRAVENOUS at 03:51

## 2017-02-05 RX ADMIN — Medication 1 TABLET: at 09:31

## 2017-02-05 RX ADMIN — AMPICILLIN SODIUM 2 G: 2 INJECTION, POWDER, FOR SOLUTION INTRAMUSCULAR; INTRAVENOUS at 05:02

## 2017-02-05 RX ADMIN — ACETAMINOPHEN 650 MG: 325 TABLET ORAL at 02:41

## 2017-02-05 RX ADMIN — ACETAMINOPHEN 650 MG: 325 TABLET ORAL at 08:11

## 2017-02-05 RX ADMIN — AMOXICILLIN 500 MG: 500 CAPSULE ORAL at 19:33

## 2017-02-05 RX ADMIN — AMPICILLIN SODIUM 2 G: 2 INJECTION, POWDER, FOR SOLUTION INTRAMUSCULAR; INTRAVENOUS at 11:06

## 2017-02-05 RX ADMIN — ACETAMINOPHEN 650 MG: 325 TABLET ORAL at 12:56

## 2017-02-05 NOTE — PROGRESS NOTES
[facility]      Name:  Anupama Arnold        MR#:  [id number]      Progress Note    Brief note:  36 y.o.  at 28w4d admitted for  premature rupture of membranes - s/p BMZ course - Mag to be turned off at 1000am this morning - last dose of Ampicillin at 1100am this morning - patient c/o mild nausea but denies contractions. Positive fetal movement.     Patient Active Problem List   Diagnosis   • Advanced maternal age in multigravida   • History of macrosomia in infant in prior pregnancy, currently pregnant   • Pregnancy   •  premature rupture of membranes (PPROM) with unknown onset of labor       OB History    Para Term  AB SAB TAB Ectopic Multiple Living   4 3 2 1 0 0 0 0 0 3      # Outcome Date GA Lbr Neel/2nd Weight Sex Delivery Anes PTL Lv   4 Current            3 Term    8 lb 8 oz (3.856 kg)  Vag-Spont   Y   2 Term    8 lb 8 oz (3.856 kg)  Vag-Spont   Y   1     6 lb 11 oz (3.033 kg)  Vag-Spont   Y        #: 1, Date: , Sex: None, Weight: 6 lb 11 oz (3.033 kg), GA: None, Delivery: Vaginal, Spontaneous Delivery, Apgar1: None, Apgar5: None, Living: Yes, Birth Comments: None    #: 2, Date: , Sex: None, Weight: 8 lb 8 oz (3.856 kg), GA: None, Delivery: Vaginal, Spontaneous Delivery, Apgar1: None, Apgar5: None, Living: Yes, Birth Comments: None    #: 3, Date: , Sex: None, Weight: 8 lb 8 oz (3.856 kg), GA: None, Delivery: Vaginal, Spontaneous Delivery, Apgar1: None, Apgar5: None, Living: Yes, Birth Comments: None    #: 4, Date: None, Sex: None, Weight: None, GA: None, Delivery: None, Apgar1: None, Apgar5: None, Living: None, Birth Comments: None      Review of Systems   Constitutional: Negative for chills and fever.   Gastrointestinal: Positive for nausea. Negative for abdominal pain and vomiting.   Genitourinary: Negative for dysuria, pelvic pain, vaginal bleeding, vaginal discharge and vaginal pain.   All other systems reviewed  and are negative.      Objective     Vitals:  Temperature: Temp:  [97.9 °F (36.6 °C)-98.4 °F (36.9 °C)] 98.3 °F (36.8 °C)  Temp Source: Temp src: Oral  BP:  BP: ()/(45-64) 98/50  Pulse:  Heart Rate:  [77-98] 88  Respirations: Resp:  [15-18] 15     Physical Examination: General appearance - alert, well appearing, and in no distress  Chest - clear to auscultation, no wheezes, rales or rhonchi, symmetric air entry  Heart - normal rate, regular rhythm, normal S1, S2, no murmurs, rubs, clicks or gallops  Abdomen - gravid, soft, nontender, nondistended, no masses or organomegaly  Musculoskeletal - no joint tenderness, deformity or swelling  Extremities - peripheral pulses normal, no pedal edema, no clubbing or cyanosis  Skin - normal coloration and turgor, no rashes, no suspicious skin lesions noted    LABS:   Lab Results   Component Value Date    WBC 10.05 02/03/2017    HGB 11.5 (L) 02/03/2017    HCT 34.5 (L) 02/03/2017    MCV 96.1 02/03/2017     02/03/2017         Non-stress test:  Reactive for gestational age     Assessment/Plan   1. IUP @ 28w4d  2. PPROM - s/p BMZ, MAG - no s/s infection - start PO amoxicillin 500mg q8 hours x 5 days, check CBC/CRP today - BPP tomorrow   NST twice daily x 1 hour or for patient concerns - d/c hailey   3. Nausea- cont Zofran prn       Dea Kenyon MD  2/5/2017 9:52 AM

## 2017-02-05 NOTE — PROGRESS NOTES
.  ANTEPARTUM ROUNDING NOTE     Admission date 2/3/2017     Patient: Anupama Arnold MRN: [id number]   YOB: 1980 Age: 36 y.o. Sex: female     SUBJECTIVE:     Anupama Arnold is a 36 y.o.,  AT 28w3d admitted for PPROM. She denies contractions or vaginal bleeding. She notes normal fetal movement.    OBJECTIVE:     Vitals:   Vitals:    17 1807 17 1907 17 2100   BP: 95/54 103/60     BP Location:       Patient Position:       Pulse: 82 85     Resp: 16 16  16   Temp:  98.4 °F (36.9 °C)  98 °F (36.7 °C)   TempSrc:  Oral  Oral   SpO2:   98%    Weight:   185 lb (83.9 kg)    Height:           Intake/Output:   Intake/Output Summary (Last 24 hours) at 17  Last data filed at 17 2100   Gross per 24 hour   Intake 3270.32 ml   Output   5325 ml   Net -2054.68 ml        Lab Results (last 7 days)     Procedure Component Value Units Date/Time    CBC & Differential [[id number]] Collected:  17    Specimen:  Blood Updated:  17 100    Narrative:       The following orders were created for panel order CBC & Differential.  Procedure                               Abnormality         Status                     ---------                               -----------         ------                     CBC Auto Differential[[id number]]         Abnormal            Final result                 Please view results for these tests on the individual orders.    CBC Auto Differential [[id number]]  (Abnormal) Collected:  17    Specimen:  Blood Updated:  17 1001     WBC 10.05 10*3/mm3      RBC 3.59 (L) 10*6/mm3      Hemoglobin 11.5 (L) g/dL      Hematocrit 34.5 (L) %      MCV 96.1 fL      MCH 32.0 pg      MCHC 33.3 g/dL      RDW 13.0 %      RDW-SD 44.9 fl      MPV 9.5 fL      Platelets 263 10*3/mm3      Neutrophil % 74.5 %      Lymphocyte % 19.6 %      Monocyte % 4.1 (L) %      Eosinophil % 1.0 %      Basophil % 0.1 %      Immature Grans % 0.7 (H) %       Neutrophils, Absolute 7.49 10*3/mm3      Lymphocytes, Absolute 1.97 10*3/mm3      Monocytes, Absolute 0.41 10*3/mm3      Eosinophils, Absolute 0.10 10*3/mm3      Basophils, Absolute 0.01 10*3/mm3      Immature Grans, Absolute 0.07 (H) 10*3/mm3     Anaerobic Culture [[id number]] Collected:  02/03/17 1038    Specimen:  Swab from Vagina Updated:  02/03/17 1108    Mycoplasma / Ureaplasma Culture [[id number]] Collected:  02/03/17 1038    Specimen:  Swab from Endocervix Updated:  02/03/17 1108    Urinalysis With / Culture If Indicated [[id number]]  (Normal) Collected:  02/03/17 1104    Specimen:  Urine from Urine, Catheter Updated:  02/03/17 1124     Color, UA Yellow      Appearance, UA Clear      pH, UA 6.5      Specific Gravity, UA 1.020      Glucose, UA Negative      Ketones, UA Negative      Bilirubin, UA Negative      Blood, UA Negative      Protein, UA Negative      Leuk Esterase, UA Negative      Nitrite, UA Negative      Urobilinogen, UA 0.2 E.U./dL     Narrative:       Urine microscopic not indicated.    Wet Prep, Genital [[id number]]  (Abnormal) Collected:  02/03/17 1038    Specimen:  Swab from Vagina Updated:  02/03/17 1131     YEAST No yeast seen      HYPHAL ELEMENTS No Hyphal elements seen      WBC'S 1+ WBC's seen (A)      Clue Cells, Wet Prep No Clue cells seen      Trichomonas, Wet Prep No Trichomonas seen     C-reactive Protein [[id number]]  (Normal) Collected:  02/03/17 1015    Specimen:  Blood Updated:  02/03/17 1143     C-Reactive Protein 0.34 mg/dL     Hepatitis B Surface Antigen [[id number]] Resulted:  09/13/16     Specimen:  Blood Updated:  02/03/17 2151     External Hepatitis B Surface Ag Negative     RPR [[id number]] Resulted:  09/13/16     Specimen:  Blood Updated:  02/03/17 2151     External RPR Non-Reactive     Rubella Antibody, IgG [[id number]] Resulted:  09/13/16     Specimen:  Blood Updated:  02/03/17 2151     External Rubella Qual Immune     HIV-1 Antibody, EIA [[id number]] Resulted:  09/13/16     Specimen:   Blood Updated:  17 2151     External HIV-1 Antibody Negative     Wound Culture [[id number]]  (Abnormal) Collected:  17 1155    Specimen:  Wound from Vagina Updated:  17 0751     Wound Culture Rare Proteus species (A)      Gram Stain Result Rare (1+) WBCs seen       Rare (1+) Gram positive bacilli     Group B Strep Culture [[id number]]  (Normal) Collected:  17 1015    Specimen:  Swab from Vaginal/Rectum Updated:  17 0812     Culture No Group B Streptococcus Isolated at 24 hrs           Appearance/Psychiatric: In no distress   Constitutional: The patient is well nourished   Cardiovascular: . Heart RRR, no murmurs/rubs/gallops   Respiratory: Respiratory effort is normal. CTAB   Abdomen: Soft, gravid, and nontender   Ext: nontender, no edema. +2/4 bilateral patellar reflexes     FHT's : appropriate for gestational age, 1 variable decel noted at approx 1500 today, no repetitive decels noted    ASSESSMENT AND PLAN:   Patient Active Problem List    Diagnosis   • Pregnancy [Z33.1]   •  premature rupture of membranes (PPROM) with unknown onset of labor [O42.919]   • Advanced maternal age in multigravida [O09.529]   • History of macrosomia in infant in prior pregnancy, currently pregnant [O09.299]     Patient currently denies regular ctx   Discussed with pt, plan is to continue magnesium until she is 24 hours from her second dose of betamethasone in am 17     LOS: 1 day    Elvira Butcher MD   2017

## 2017-02-05 NOTE — PLAN OF CARE
Problem: Patient Care Overview (Adult)  Goal: Plan of Care Review  Outcome: Ongoing (interventions implemented as appropriate)    17 0655   Coping/Psychosocial Response Interventions   Plan Of Care Reviewed With patient   Patient Care Overview   Progress progress toward functional goals as expected       Goal: Adult Individualization and Mutuality  Outcome: Ongoing (interventions implemented as appropriate)    17 0649 17 0655   Individualization   Patient Specific Preferences --  Prolong pregnancy   Patient Specific Goals --  Breastfeeding   Patient Specific Interventions --  Mag, Antibiotics   Mutuality/Individual Preferences   What Anxieties, Fears or Concerns Do You Have About Your Health or Care? Having a  infant --    What Questions Do You Have About Your Health or Care? --  Can I take a shower?   What Information Would Help Us Give You More Personalized Care? Father in law is Dr. Arnold with [facility]- Neonatologist --          Problem:  Labor (Adult,Obstetrics,Pediatric)  Goal: Signs and Symptoms of Listed Potential Problems Will be Absent or Manageable ( Labor)  Outcome: Ongoing (interventions implemented as appropriate)    17 0655    Labor   Problems Assessed ( Labor) all   Problems Present ( Labor) none

## 2017-02-06 ENCOUNTER — ANESTHESIA EVENT (OUTPATIENT)
Dept: LABOR AND DELIVERY | Facility: HOSPITAL | Age: 37
End: 2017-02-06

## 2017-02-06 ENCOUNTER — ANESTHESIA (OUTPATIENT)
Dept: LABOR AND DELIVERY | Facility: HOSPITAL | Age: 37
End: 2017-02-06

## 2017-02-06 LAB
A VAGINAE DNA VAG QL NAA+PROBE: NORMAL SCORE
ALBUMIN SERPL-MCNC: 2.9 G/DL (ref 3.5–5.2)
ALBUMIN/GLOB SERPL: 0.9 G/DL
ALP SERPL-CCNC: 82 U/L (ref 39–117)
ALT SERPL W P-5'-P-CCNC: 42 U/L (ref 1–33)
ANION GAP SERPL CALCULATED.3IONS-SCNC: 16.4 MMOL/L
ARTERIAL PATENCY WRIST A: POSITIVE
AST SERPL-CCNC: 40 U/L (ref 1–32)
ATMOSPHERIC PRESS: 751 MMHG
ATMOSPHERIC PRESS: 758.4 MMHG
ATMOSPHERIC PRESS: 759 MMHG
BACTERIA SPEC AEROBE CULT: NORMAL
BASE EXCESS BLDA CALC-SCNC: -3.9 MMOL/L (ref 0–2)
BASE EXCESS BLDCOA CALC-SCNC: -0.8 MMOL/L
BASE EXCESS BLDCOV CALC-SCNC: -2.2 MMOL/L (ref -30–30)
BASOPHILS # BLD AUTO: 0.01 10*3/MM3 (ref 0–0.2)
BASOPHILS # BLD AUTO: 0.01 10*3/MM3 (ref 0–0.2)
BASOPHILS NFR BLD AUTO: 0.1 % (ref 0–1.5)
BASOPHILS NFR BLD AUTO: 0.1 % (ref 0–1.5)
BDY SITE: ABNORMAL
BILIRUB SERPL-MCNC: 0.2 MG/DL (ref 0.1–1.2)
BUN BLD-MCNC: 8 MG/DL (ref 6–20)
BUN/CREAT SERPL: 15.4 (ref 7–25)
BVAB2 DNA VAG QL NAA+PROBE: NORMAL SCORE
C ALBICANS DNA VAG QL NAA+PROBE: NEGATIVE
C GLABRATA DNA VAG QL NAA+PROBE: NEGATIVE
C TRACH RRNA SPEC QL NAA+PROBE: NEGATIVE
CALCIUM SPEC-SCNC: 8 MG/DL (ref 8.6–10.5)
CHLORIDE SERPL-SCNC: 100 MMOL/L (ref 98–107)
CO2 SERPL-SCNC: 16.6 MMOL/L (ref 22–29)
CREAT BLD-MCNC: 0.52 MG/DL (ref 0.57–1)
CRP SERPL-MCNC: 5.12 MG/DL (ref 0–0.5)
DEPRECATED RDW RBC AUTO: 48.5 FL (ref 37–54)
DEPRECATED RDW RBC AUTO: 50.4 FL (ref 37–54)
EOSINOPHIL # BLD AUTO: 0.02 10*3/MM3 (ref 0–0.7)
EOSINOPHIL # BLD AUTO: 0.03 10*3/MM3 (ref 0–0.7)
EOSINOPHIL NFR BLD AUTO: 0.1 % (ref 0.3–6.2)
EOSINOPHIL NFR BLD AUTO: 0.3 % (ref 0.3–6.2)
ERYTHROCYTE [DISTWIDTH] IN BLOOD BY AUTOMATED COUNT: 13.3 % (ref 11.7–13)
ERYTHROCYTE [DISTWIDTH] IN BLOOD BY AUTOMATED COUNT: 13.8 % (ref 11.7–13)
GFR SERPL CREATININE-BSD FRML MDRD: 133 ML/MIN/1.73
GLOBULIN UR ELPH-MCNC: 3.2 GM/DL
GLUCOSE BLD-MCNC: 124 MG/DL (ref 65–99)
GLUCOSE BLDC GLUCOMTR-MCNC: 79 MG/DL (ref 70–130)
HCO3 BLDA-SCNC: 19.4 MMOL/L (ref 22–28)
HCO3 BLDCOA-SCNC: 24.1 MMOL/L (ref 22–28)
HCO3 BLDCOV-SCNC: 22.3 MMOL/L
HCT VFR BLD AUTO: 32.3 % (ref 35.6–45.5)
HCT VFR BLD AUTO: 34.5 % (ref 35.6–45.5)
HGB BLD-MCNC: 10.3 G/DL (ref 11.9–15.5)
HGB BLD-MCNC: 11.2 G/DL (ref 11.9–15.5)
HOROWITZ INDEX BLD+IHG-RTO: 21 %
IMM GRANULOCYTES # BLD: 0.04 10*3/MM3 (ref 0–0.03)
IMM GRANULOCYTES # BLD: 0.11 10*3/MM3 (ref 0–0.03)
IMM GRANULOCYTES NFR BLD: 0.4 % (ref 0–0.5)
IMM GRANULOCYTES NFR BLD: 0.8 % (ref 0–0.5)
LYMPHOCYTES # BLD AUTO: 1.27 10*3/MM3 (ref 0.9–4.8)
LYMPHOCYTES # BLD AUTO: 1.56 10*3/MM3 (ref 0.9–4.8)
LYMPHOCYTES NFR BLD AUTO: 10.7 % (ref 19.6–45.3)
LYMPHOCYTES NFR BLD AUTO: 12.3 % (ref 19.6–45.3)
MCH RBC QN AUTO: 32.2 PG (ref 26.9–32)
MCH RBC QN AUTO: 32.5 PG (ref 26.9–32)
MCHC RBC AUTO-ENTMCNC: 31.9 G/DL (ref 32.4–36.3)
MCHC RBC AUTO-ENTMCNC: 32.5 G/DL (ref 32.4–36.3)
MCV RBC AUTO: 100 FL (ref 80.5–98.2)
MCV RBC AUTO: 100.9 FL (ref 80.5–98.2)
MEGA1 DNA VAG QL NAA+PROBE: NORMAL SCORE
MODALITY: ABNORMAL
MONOCYTES # BLD AUTO: 0.16 10*3/MM3 (ref 0.2–1.2)
MONOCYTES # BLD AUTO: 1.14 10*3/MM3 (ref 0.2–1.2)
MONOCYTES NFR BLD AUTO: 1.6 % (ref 5–12)
MONOCYTES NFR BLD AUTO: 7.8 % (ref 5–12)
N GONORRHOEA RRNA SPEC QL NAA+PROBE: NEGATIVE
NEUTROPHILS # BLD AUTO: 11.73 10*3/MM3 (ref 1.9–8.1)
NEUTROPHILS # BLD AUTO: 8.78 10*3/MM3 (ref 1.9–8.1)
NEUTROPHILS NFR BLD AUTO: 80.5 % (ref 42.7–76)
NEUTROPHILS NFR BLD AUTO: 85.3 % (ref 42.7–76)
O2 A-A PPRESDIFF RESPIRATORY: 0.7 MMHG
PCO2 BLDA: 29.4 MM HG (ref 35–45)
PCO2 BLDCOA: 40 MMHG
PCO2 BLDCOV: 37 MM HG (ref 35–51.3)
PH BLDA: 7.43 PH UNITS (ref 7.35–7.45)
PH BLDCOA: 7.39 [PH] (ref 7.18–7.34)
PH BLDCOV: 7.39 [PH] (ref 7.26–7.4)
PLATELET # BLD AUTO: 198 10*3/MM3 (ref 140–500)
PLATELET # BLD AUTO: 235 10*3/MM3 (ref 140–500)
PMV BLD AUTO: 9.5 FL (ref 6–12)
PMV BLD AUTO: 9.6 FL (ref 6–12)
PO2 BLDA: 84.6 MM HG (ref 80–100)
PO2 BLDCOA: 29.4 MMHG (ref 12–26)
PO2 BLDCOV: 32.1 MM HG (ref 19–39)
POTASSIUM BLD-SCNC: 4.4 MMOL/L (ref 3.5–5.2)
PROT SERPL-MCNC: 6.1 G/DL (ref 6–8.5)
RBC # BLD AUTO: 3.2 10*6/MM3 (ref 3.9–5.2)
RBC # BLD AUTO: 3.45 10*6/MM3 (ref 3.9–5.2)
SAO2 % BLDCOA: 55.2 % (ref 92–99)
SAO2 % BLDCOA: 61.6 % (ref 92–99)
SAO2 % BLDCOA: 96.8 % (ref 92–99)
SAO2 % BLDCOV: ABNORMAL %
SODIUM BLD-SCNC: 133 MMOL/L (ref 136–145)
T VAGINALIS RRNA SPEC QL NAA+PROBE: NEGATIVE
TOTAL RATE: 18 BREATHS/MINUTE
WBC NRBC COR # BLD: 10.29 10*3/MM3 (ref 4.5–10.7)
WBC NRBC COR # BLD: 14.57 10*3/MM3 (ref 4.5–10.7)

## 2017-02-06 PROCEDURE — 82803 BLOOD GASES ANY COMBINATION: CPT

## 2017-02-06 PROCEDURE — 36600 WITHDRAWAL OF ARTERIAL BLOOD: CPT

## 2017-02-06 PROCEDURE — 80053 COMPREHEN METABOLIC PANEL: CPT | Performed by: OBSTETRICS & GYNECOLOGY

## 2017-02-06 PROCEDURE — 25010000002 FENTANYL CITRATE (PF) 100 MCG/2ML SOLUTION

## 2017-02-06 PROCEDURE — 86140 C-REACTIVE PROTEIN: CPT | Performed by: OBSTETRICS & GYNECOLOGY

## 2017-02-06 PROCEDURE — 25010000003 CEFAZOLIN IN DEXTROSE 2-4 GM/100ML-% SOLUTION

## 2017-02-06 PROCEDURE — 25010000002 ONDANSETRON PER 1 MG: Performed by: ANESTHESIOLOGY

## 2017-02-06 PROCEDURE — 25010000002 MORPHINE PER 10 MG: Performed by: ANESTHESIOLOGY

## 2017-02-06 PROCEDURE — 63710000001 DIPHENHYDRAMINE PER 50 MG: Performed by: OBSTETRICS & GYNECOLOGY

## 2017-02-06 PROCEDURE — 82962 GLUCOSE BLOOD TEST: CPT

## 2017-02-06 PROCEDURE — 85025 COMPLETE CBC W/AUTO DIFF WBC: CPT | Performed by: OBSTETRICS & GYNECOLOGY

## 2017-02-06 PROCEDURE — 25010000002 MORPHINE SULFATE (PF) 2 MG/ML SOLUTION

## 2017-02-06 PROCEDURE — 59510 CESAREAN DELIVERY: CPT | Performed by: OBSTETRICS & GYNECOLOGY

## 2017-02-06 PROCEDURE — 94799 UNLISTED PULMONARY SVC/PX: CPT

## 2017-02-06 PROCEDURE — 25010000003 CEFAZOLIN IN DEXTROSE 2-4 GM/100ML-% SOLUTION: Performed by: OBSTETRICS & GYNECOLOGY

## 2017-02-06 RX ORDER — SODIUM CHLORIDE, SODIUM LACTATE, POTASSIUM CHLORIDE, CALCIUM CHLORIDE 600; 310; 30; 20 MG/100ML; MG/100ML; MG/100ML; MG/100ML
75 INJECTION, SOLUTION INTRAVENOUS CONTINUOUS
Status: DISCONTINUED | OUTPATIENT
Start: 2017-02-06 | End: 2017-02-06

## 2017-02-06 RX ORDER — LANOLIN 100 %
OINTMENT (GRAM) TOPICAL
Status: DISCONTINUED | OUTPATIENT
Start: 2017-02-06 | End: 2017-02-10 | Stop reason: HOSPADM

## 2017-02-06 RX ORDER — HYDROMORPHONE HYDROCHLORIDE 1 MG/ML
0.25 INJECTION, SOLUTION INTRAMUSCULAR; INTRAVENOUS; SUBCUTANEOUS
Status: DISCONTINUED | OUTPATIENT
Start: 2017-02-06 | End: 2017-02-06 | Stop reason: HOSPADM

## 2017-02-06 RX ORDER — PROMETHAZINE HYDROCHLORIDE 25 MG/ML
12.5 INJECTION, SOLUTION INTRAMUSCULAR; INTRAVENOUS EVERY 6 HOURS PRN
Status: DISCONTINUED | OUTPATIENT
Start: 2017-02-06 | End: 2017-02-06 | Stop reason: HOSPADM

## 2017-02-06 RX ORDER — DIPHENHYDRAMINE HYDROCHLORIDE 50 MG/ML
25 INJECTION INTRAMUSCULAR; INTRAVENOUS EVERY 4 HOURS PRN
Status: DISCONTINUED | OUTPATIENT
Start: 2017-02-06 | End: 2017-02-06 | Stop reason: HOSPADM

## 2017-02-06 RX ORDER — SODIUM CHLORIDE 0.9 % (FLUSH) 0.9 %
1-10 SYRINGE (ML) INJECTION AS NEEDED
Status: DISCONTINUED | OUTPATIENT
Start: 2017-02-06 | End: 2017-02-06 | Stop reason: HOSPADM

## 2017-02-06 RX ORDER — OXYTOCIN/RINGER'S LACTATE 10/500ML
PLASTIC BAG, INJECTION (ML) INTRAVENOUS
Status: COMPLETED
Start: 2017-02-06 | End: 2017-02-06

## 2017-02-06 RX ORDER — MISOPROSTOL 200 UG/1
600 TABLET ORAL ONCE AS NEEDED
Status: DISCONTINUED | OUTPATIENT
Start: 2017-02-06 | End: 2017-02-10 | Stop reason: HOSPADM

## 2017-02-06 RX ORDER — MEPERIDINE HYDROCHLORIDE 50 MG/ML
INJECTION INTRAMUSCULAR; INTRAVENOUS; SUBCUTANEOUS
Status: DISPENSED
Start: 2017-02-06 | End: 2017-02-06

## 2017-02-06 RX ORDER — CEFAZOLIN SODIUM 2 G/100ML
2 INJECTION, SOLUTION INTRAVENOUS EVERY 8 HOURS
Status: DISCONTINUED | OUTPATIENT
Start: 2017-02-06 | End: 2017-02-08

## 2017-02-06 RX ORDER — BUPIVACAINE HYDROCHLORIDE 7.5 MG/ML
INJECTION, SOLUTION EPIDURAL; RETROBULBAR AS NEEDED
Status: DISCONTINUED | OUTPATIENT
Start: 2017-02-06 | End: 2017-02-06 | Stop reason: SURG

## 2017-02-06 RX ORDER — SODIUM CHLORIDE, SODIUM LACTATE, POTASSIUM CHLORIDE, CALCIUM CHLORIDE 600; 310; 30; 20 MG/100ML; MG/100ML; MG/100ML; MG/100ML
125 INJECTION, SOLUTION INTRAVENOUS CONTINUOUS
Status: DISCONTINUED | OUTPATIENT
Start: 2017-02-06 | End: 2017-02-06

## 2017-02-06 RX ORDER — BISACODYL 10 MG
10 SUPPOSITORY, RECTAL RECTAL DAILY PRN
Status: DISCONTINUED | OUTPATIENT
Start: 2017-02-06 | End: 2017-02-10 | Stop reason: HOSPADM

## 2017-02-06 RX ORDER — METHYLERGONOVINE MALEATE 0.2 MG/ML
200 INJECTION INTRAVENOUS AS NEEDED
Status: DISCONTINUED | OUTPATIENT
Start: 2017-02-06 | End: 2017-02-06 | Stop reason: HOSPADM

## 2017-02-06 RX ORDER — ONDANSETRON 2 MG/ML
INJECTION INTRAMUSCULAR; INTRAVENOUS AS NEEDED
Status: DISCONTINUED | OUTPATIENT
Start: 2017-02-06 | End: 2017-02-06 | Stop reason: SURG

## 2017-02-06 RX ORDER — MORPHINE SULFATE 1 MG/ML
INJECTION, SOLUTION EPIDURAL; INTRATHECAL; INTRAVENOUS AS NEEDED
Status: DISCONTINUED | OUTPATIENT
Start: 2017-02-06 | End: 2017-02-06 | Stop reason: SURG

## 2017-02-06 RX ORDER — DIPHENHYDRAMINE HCL 25 MG
25 CAPSULE ORAL EVERY 4 HOURS PRN
Status: DISCONTINUED | OUTPATIENT
Start: 2017-02-06 | End: 2017-02-06 | Stop reason: HOSPADM

## 2017-02-06 RX ORDER — FENTANYL CITRATE 50 UG/ML
INJECTION, SOLUTION INTRAMUSCULAR; INTRAVENOUS
Status: COMPLETED
Start: 2017-02-06 | End: 2017-02-06

## 2017-02-06 RX ORDER — LIDOCAINE HYDROCHLORIDE 10 MG/ML
5 INJECTION, SOLUTION INFILTRATION; PERINEURAL AS NEEDED
Status: DISCONTINUED | OUTPATIENT
Start: 2017-02-06 | End: 2017-02-06

## 2017-02-06 RX ORDER — OXYCODONE HYDROCHLORIDE AND ACETAMINOPHEN 5; 325 MG/1; MG/1
2 TABLET ORAL EVERY 4 HOURS PRN
Status: DISCONTINUED | OUTPATIENT
Start: 2017-02-06 | End: 2017-02-10 | Stop reason: HOSPADM

## 2017-02-06 RX ORDER — CEFAZOLIN SODIUM 2 G/100ML
INJECTION, SOLUTION INTRAVENOUS
Status: COMPLETED
Start: 2017-02-06 | End: 2017-02-06

## 2017-02-06 RX ORDER — DIPHENHYDRAMINE HCL 25 MG
25 CAPSULE ORAL EVERY 4 HOURS PRN
Status: DISCONTINUED | OUTPATIENT
Start: 2017-02-06 | End: 2017-02-10 | Stop reason: HOSPADM

## 2017-02-06 RX ORDER — ONDANSETRON 2 MG/ML
4 INJECTION INTRAMUSCULAR; INTRAVENOUS EVERY 6 HOURS PRN
Status: DISCONTINUED | OUTPATIENT
Start: 2017-02-06 | End: 2017-02-06 | Stop reason: HOSPADM

## 2017-02-06 RX ORDER — ONDANSETRON 2 MG/ML
4 INJECTION INTRAMUSCULAR; INTRAVENOUS ONCE AS NEEDED
Status: DISCONTINUED | OUTPATIENT
Start: 2017-02-06 | End: 2017-02-06 | Stop reason: HOSPADM

## 2017-02-06 RX ORDER — MORPHINE SULFATE 1 MG/ML
INJECTION, SOLUTION EPIDURAL; INTRATHECAL; INTRAVENOUS
Status: DISPENSED
Start: 2017-02-06 | End: 2017-02-06

## 2017-02-06 RX ORDER — IBUPROFEN 800 MG/1
800 TABLET ORAL EVERY 8 HOURS PRN
Status: DISCONTINUED | OUTPATIENT
Start: 2017-02-06 | End: 2017-02-10 | Stop reason: HOSPADM

## 2017-02-06 RX ORDER — OXYCODONE HYDROCHLORIDE AND ACETAMINOPHEN 5; 325 MG/1; MG/1
2 TABLET ORAL EVERY 4 HOURS PRN
Status: DISCONTINUED | OUTPATIENT
Start: 2017-02-06 | End: 2017-02-06 | Stop reason: HOSPADM

## 2017-02-06 RX ORDER — ONDANSETRON 4 MG/1
4 TABLET, FILM COATED ORAL EVERY 6 HOURS PRN
Status: DISCONTINUED | OUTPATIENT
Start: 2017-02-06 | End: 2017-02-06 | Stop reason: HOSPADM

## 2017-02-06 RX ORDER — ONDANSETRON 4 MG/1
4 TABLET, ORALLY DISINTEGRATING ORAL EVERY 6 HOURS PRN
Status: DISCONTINUED | OUTPATIENT
Start: 2017-02-06 | End: 2017-02-06 | Stop reason: HOSPADM

## 2017-02-06 RX ORDER — NALOXONE HCL 0.4 MG/ML
0.2 VIAL (ML) INJECTION
Status: DISCONTINUED | OUTPATIENT
Start: 2017-02-06 | End: 2017-02-06 | Stop reason: HOSPADM

## 2017-02-06 RX ORDER — ONDANSETRON 4 MG/1
4 TABLET, FILM COATED ORAL EVERY 8 HOURS PRN
Status: DISCONTINUED | OUTPATIENT
Start: 2017-02-06 | End: 2017-02-10 | Stop reason: HOSPADM

## 2017-02-06 RX ORDER — PRENATAL VIT NO.126/IRON/FOLIC 28MG-0.8MG
1 TABLET ORAL DAILY
Status: DISCONTINUED | OUTPATIENT
Start: 2017-02-06 | End: 2017-02-10 | Stop reason: HOSPADM

## 2017-02-06 RX ORDER — MAGNESIUM SULFATE HEPTAHYDRATE 40 MG/ML
INJECTION, SOLUTION INTRAVENOUS
Status: DISPENSED
Start: 2017-02-06 | End: 2017-02-06

## 2017-02-06 RX ORDER — OXYTOCIN/RINGER'S LACTATE 10/500ML
125 PLASTIC BAG, INJECTION (ML) INTRAVENOUS CONTINUOUS PRN
Status: DISCONTINUED | OUTPATIENT
Start: 2017-02-06 | End: 2017-02-06 | Stop reason: HOSPADM

## 2017-02-06 RX ORDER — MORPHINE SULFATE 2 MG/ML
INJECTION, SOLUTION INTRAMUSCULAR; INTRAVENOUS
Status: COMPLETED
Start: 2017-02-06 | End: 2017-02-06

## 2017-02-06 RX ORDER — SIMETHICONE 80 MG
80 TABLET,CHEWABLE ORAL 4 TIMES DAILY PRN
Status: DISCONTINUED | OUTPATIENT
Start: 2017-02-06 | End: 2017-02-10 | Stop reason: HOSPADM

## 2017-02-06 RX ORDER — DOCUSATE SODIUM 100 MG/1
100 CAPSULE, LIQUID FILLED ORAL 2 TIMES DAILY PRN
Status: DISCONTINUED | OUTPATIENT
Start: 2017-02-06 | End: 2017-02-10 | Stop reason: HOSPADM

## 2017-02-06 RX ORDER — CEFAZOLIN SODIUM 2 G/100ML
2 INJECTION, SOLUTION INTRAVENOUS ONCE
Status: COMPLETED | OUTPATIENT
Start: 2017-02-06 | End: 2017-02-06

## 2017-02-06 RX ORDER — METHYLERGONOVINE MALEATE 0.2 MG/ML
200 INJECTION INTRAVENOUS ONCE
Status: DISCONTINUED | OUTPATIENT
Start: 2017-02-06 | End: 2017-02-10 | Stop reason: HOSPADM

## 2017-02-06 RX ORDER — OXYTOCIN/RINGER'S LACTATE 10/500ML
999 PLASTIC BAG, INJECTION (ML) INTRAVENOUS ONCE
Status: DISCONTINUED | OUTPATIENT
Start: 2017-02-06 | End: 2017-02-06 | Stop reason: HOSPADM

## 2017-02-06 RX ORDER — CARBOPROST TROMETHAMINE 250 UG/ML
250 INJECTION, SOLUTION INTRAMUSCULAR AS NEEDED
Status: DISCONTINUED | OUTPATIENT
Start: 2017-02-06 | End: 2017-02-06 | Stop reason: HOSPADM

## 2017-02-06 RX ORDER — IBUPROFEN 600 MG/1
600 TABLET ORAL EVERY 6 HOURS PRN
Status: DISCONTINUED | OUTPATIENT
Start: 2017-02-06 | End: 2017-02-06 | Stop reason: HOSPADM

## 2017-02-06 RX ADMIN — CEFAZOLIN SODIUM 2 G: 2 INJECTION, SOLUTION INTRAVENOUS at 05:59

## 2017-02-06 RX ADMIN — FENTANYL CITRATE 10 MCG: 50 INJECTION INTRAMUSCULAR; INTRAVENOUS at 06:11

## 2017-02-06 RX ADMIN — FENTANYL CITRATE 50 MCG: 50 INJECTION INTRAMUSCULAR; INTRAVENOUS at 06:30

## 2017-02-06 RX ADMIN — FENTANYL CITRATE 50 MCG: 50 INJECTION INTRAMUSCULAR; INTRAVENOUS at 06:40

## 2017-02-06 RX ADMIN — CEFAZOLIN SODIUM 2 G: 2 INJECTION, SOLUTION INTRAVENOUS at 12:30

## 2017-02-06 RX ADMIN — ONDANSETRON 4 MG: 2 INJECTION INTRAMUSCULAR; INTRAVENOUS at 06:23

## 2017-02-06 RX ADMIN — OXYTOCIN 1800 ML/HR: 10 INJECTION, SOLUTION INTRAMUSCULAR; INTRAVENOUS at 06:30

## 2017-02-06 RX ADMIN — SODIUM CHLORIDE, POTASSIUM CHLORIDE, SODIUM LACTATE AND CALCIUM CHLORIDE: 600; 310; 30; 20 INJECTION, SOLUTION INTRAVENOUS at 06:04

## 2017-02-06 RX ADMIN — OXYTOCIN: 10 INJECTION, SOLUTION INTRAMUSCULAR; INTRAVENOUS at 06:45

## 2017-02-06 RX ADMIN — OXYCODONE HYDROCHLORIDE AND ACETAMINOPHEN 1 TABLET: 5; 325 TABLET ORAL at 22:19

## 2017-02-06 RX ADMIN — MORPHINE SULFATE 1 MG: 2 INJECTION, SOLUTION INTRAMUSCULAR; INTRAVENOUS at 09:28

## 2017-02-06 RX ADMIN — DIPHENHYDRAMINE HYDROCHLORIDE 25 MG: 25 CAPSULE ORAL at 06:30

## 2017-02-06 RX ADMIN — MORPHINE SULFATE 1 MG: 4 INJECTION, SOLUTION INTRAMUSCULAR; INTRAVENOUS at 09:28

## 2017-02-06 RX ADMIN — MORPHINE SULFATE 0.2 MG: 1 INJECTION EPIDURAL; INTRATHECAL; INTRAVENOUS at 06:11

## 2017-02-06 RX ADMIN — SODIUM CHLORIDE, POTASSIUM CHLORIDE, SODIUM LACTATE AND CALCIUM CHLORIDE 999 ML/HR: 600; 310; 30; 20 INJECTION, SOLUTION INTRAVENOUS at 05:26

## 2017-02-06 RX ADMIN — MORPHINE SULFATE 1 MG: 4 INJECTION, SOLUTION INTRAMUSCULAR; INTRAVENOUS at 10:23

## 2017-02-06 RX ADMIN — CEFAZOLIN SODIUM 2 G: 2 INJECTION, SOLUTION INTRAVENOUS at 21:18

## 2017-02-06 RX ADMIN — IBUPROFEN 800 MG: 800 TABLET ORAL at 21:05

## 2017-02-06 RX ADMIN — BUPIVACAINE HYDROCHLORIDE 1.8 ML: 7.5 INJECTION, SOLUTION EPIDURAL; RETROBULBAR at 06:11

## 2017-02-06 RX ADMIN — IBUPROFEN 800 MG: 800 TABLET ORAL at 12:49

## 2017-02-06 RX ADMIN — DIPHENHYDRAMINE HYDROCHLORIDE 25 MG: 25 CAPSULE ORAL at 22:27

## 2017-02-06 NOTE — CODE DOCUMENTATION
RRT called due to low bp pt having tremors all over and BS 79.  Pt was confused since 0810 now having NIH assessment per VINCENT Grover RN

## 2017-02-06 NOTE — ANESTHESIA PREPROCEDURE EVALUATION
Anesthesia Evaluation     Patient summary reviewed and Nursing notes reviewed    Airway   Mallampati: II  TM distance: >3 FB  Neck ROM: full  no difficulty expected  Dental - normal exam     Pulmonary - negative pulmonary ROS and normal exam   Cardiovascular - negative cardio ROS and normal exam    Neuro/Psych- negative ROS  GI/Hepatic/Renal/Endo - negative ROS     Musculoskeletal (-) negative ROS    Abdominal  - normal exam   Substance History - negative use     OB/GYN negative ob/gyn ROS         Other                             Anesthesia Plan    ASA 2     spinal     Anesthetic plan and risks discussed with patient.

## 2017-02-06 NOTE — NON STRESS TEST
Anupama Arnold, a  at 28w4d with an GOSIA of 2017, by Last Menstrual Period, was seen at [facility] LABOR DELIVERY for a nonstress test.    No chief complaint on file.      Interpretation A  Nonstress Test Interpretation A: Reactive (17 : Renata Murcia RN)

## 2017-02-06 NOTE — NURSING NOTE
Called into room, stating contractions are getting stronger and more often.  Requesting that external fetal monitors be placed.  Very tearful.  Very reassurance given.

## 2017-02-06 NOTE — CODE DOCUMENTATION
AA present upon arrival conor and Dr Haji and OB MD Dillon here upon arrival.  AA now gone has had a spinal unable to move lower extremitys.  Has gotten fluid bolus BP up. Bolus stopped /62..

## 2017-02-06 NOTE — NURSING NOTE
"Patient complaining of \"contractions\" associated with fetal movement. Monitoring for contractions. No contractions noted.  "

## 2017-02-06 NOTE — PROGRESS NOTES
[facility]      Name:  Anupama Arnold        MR#:  [id number]      Progress Note    Brief note:  36 y.o.  at 28w5d admitted with PPROM - called to the bedside for patient reporting painful contractions - Patient 3 cm on SVE -      Patient Active Problem List   Diagnosis   • Advanced maternal age in multigravida   • History of macrosomia in infant in prior pregnancy, currently pregnant   • Pregnancy   •  premature rupture of membranes (PPROM) with unknown onset of labor       OB History    Para Term  AB SAB TAB Ectopic Multiple Living   4 3 2 1 0 0 0 0 0 3      # Outcome Date GA Lbr Neel/2nd Weight Sex Delivery Anes PTL Lv   4 Current            3 Term    8 lb 8 oz (3.856 kg)  Vag-Spont   Y   2 Term    8 lb 8 oz (3.856 kg)  Vag-Spont   Y   1     6 lb 11 oz (3.033 kg)  Vag-Spont   Y        #: 1, Date: , Sex: None, Weight: 6 lb 11 oz (3.033 kg), GA: None, Delivery: Vaginal, Spontaneous Delivery, Apgar1: None, Apgar5: None, Living: Yes, Birth Comments: None    #: 2, Date: , Sex: None, Weight: 8 lb 8 oz (3.856 kg), GA: None, Delivery: Vaginal, Spontaneous Delivery, Apgar1: None, Apgar5: None, Living: Yes, Birth Comments: None    #: 3, Date: , Sex: None, Weight: 8 lb 8 oz (3.856 kg), GA: None, Delivery: Vaginal, Spontaneous Delivery, Apgar1: None, Apgar5: None, Living: Yes, Birth Comments: None    #: 4, Date: None, Sex: None, Weight: None, GA: None, Delivery: None, Apgar1: None, Apgar5: None, Living: None, Birth Comments: None      Review of Systems    Objective     Vitals:  Temperature: Temp:  [97.8 °F (36.6 °C)-98.9 °F (37.2 °C)] 98.4 °F (36.9 °C)  Temp Source: Temp src: Oral  BP:  BP: ()/(43-60) 117/60  Pulse:  Heart Rate:  [79-94] 90  Respirations: Resp:  [15-18] 18     Physical Examination: General appearance - alert, well appearing, and in no distress  Abdomen - gravid, soft, nontender, nondistended, no masses or  organomegaly  Musculoskeletal - no joint tenderness, deformity or swelling  Extremities - peripheral pulses normal, no pedal edema, no clubbing or cyanosis  Skin - normal coloration and turgor, no rashes, no suspicious skin lesions noted    LABS:   Lab Results   Component Value Date    WBC 14.57 (H) 2017    HGB 10.3 (L) 2017    HCT 32.3 (L) 2017    .9 (H) 2017     2017       Non-stress test:  Reactive for GA     Assessment/Plan   1. IUP @ 28w5d  2. PPROM/PTL - start mag for neuroprotection and proceed to OR for  section -Kefzol pre-op - peds notified     Dea Kenyon MD  2017 5:33 AM

## 2017-02-06 NOTE — NURSING NOTE
"Pt asking for something to help with the shaking, pt states \" I  Need the shaking to stop\", this RN discussed getting demerol order, pt agrees, this Rn attempting to obtain bp prior to giving demerol, pt shaking and Prasad Rn at bedside helping this RN obtain blood for pt cord blood harvest kit  "

## 2017-02-06 NOTE — L&D DELIVERY NOTE
[facility]   Section Operative Note    Pre-Operative Dx:   1.  IUP at 28-5 weeks    2. breech and  labor   Breech    3.  Premature Rupture of Membranes    Postoperative dx:    1.  Same     Procedure: Procedure(s):   SECTION PRIMARY   Surgeon/Assistant: Surgeon(s):  MD Darian Benitez Petrolia         Anesthesia:  Anesthesiologist: Spinal  Anesthesiologist: Flakito Archuleta MD     EBL: 700 mL mls.              :     I/O this shift:  In: 600 [I.V.:600]  Out: -    Antibiotics: cefazolin (Ancef)     Infant:      Name:           Gender: female  infant    Weight: 2 lb 10 oz (1.19 kg)     Apgars: 8   @ 1 minute /     8   @ 5 minutes    Cord gases: Venous:    Lab Results   Component Value Date    PHCVEN 7.39 2017        Arterial:    Lab Results   Component Value Date    PHCART 7.39 (H) 2017        Indication for C/Section:  Breech          Priority for C/Section:  Emergency      Procedure Details:   See dictation       Complications:   None      Disposition:   Mother to Mother Baby/Postpartum  in stable condition currently.   Baby to NICU  in stable condition currently.       Dea Kenyon MD  2017  7:20 AM

## 2017-02-06 NOTE — LACTATION NOTE
Started hgp and instructed mom to pump every 3 hours. Reviewed milk storage. Encouraged to call if needing further assistance.

## 2017-02-06 NOTE — PLAN OF CARE
Problem: Patient Care Overview (Adult)  Goal: Plan of Care Review  Outcome: Ongoing (interventions implemented as appropriate)    17 0956   Coping/Psychosocial Response Interventions   Plan Of Care Reviewed With patient;spouse   Patient Care Overview   Progress improving       Goal: Adult Individualization and Mutuality  Outcome: Outcome(s) achieved Date Met:  17    Problem: Postpartum ( Delivery) (Adult)  Goal: Signs and Symptoms of Listed Potential Problems Will be Absent or Manageable (Postpartum)  Outcome: Ongoing (interventions implemented as appropriate)

## 2017-02-06 NOTE — NURSING NOTE
"Still unable to obtain bp reading from, automatic bp, bp cuff moved to lower leg,  pt continues shaking head back and forth, smacking lips together, pt states \" I am cold and shaking\", pt staes \" I feel like I am going to get sick\", this RN attempting to give demerol and phenergan but wanting to obtain better bp prior to administration, pt informed, pt  has been at bsd   "

## 2017-02-06 NOTE — NURSING NOTE
Automatic bp cuff not reading bp, this RN asked for manual bp cuff to be brought to recovery room, [facility] transport team at [facility] with baby prior to transporting baby, pt asking for something to help the shakes, this RN encouraging pt to take slow breaths, relaxtion techniques discussed, pt shaking head back and forth saying, no, no, no.

## 2017-02-06 NOTE — NURSING NOTE
called and informed of pt sudden confusion, VSS but bp had been as low as 80's/40's earlier, bleeding scant to small, fundus firm, 02 saturation %, aa has been called and on way to bedside in recovery, this RN asked OB to come to bedside also and evaluate pt, stat cbc ordered

## 2017-02-06 NOTE — NURSING NOTE
"Contractions every 3 minutes, has not voided \"for several hours\".  Up to bathroom with assistance.  Voided 400cc clear yellow urine.  Assisted back to bed without assistance.    "

## 2017-02-06 NOTE — NURSING NOTE
Assessment done by rapid response, all labs wnl, pt now answering questions appropriately, skin pink, lips pink, will continue to monitor pt in recovery

## 2017-02-06 NOTE — OP NOTE
Date of Procedure:  2017    PREOPERATIVE DIAGNOSES:  1.  A 36-year-old  4, para 2-1-0-3 with  premature rupture of membranes.  2.   labor.   3.  Breech presentation.   4.  Advanced maternal age.    POSTOPERATIVE DIAGNOSES:  1.  A 36-year-old  4, para 2-1-0-3 with  premature rupture of membranes.  2.   labor.   3.  Breech presentation.   4.  Advanced maternal age.  5.  Delivered.    PROCEDURE PERFORMED: Primary lower transverse  section.    SURGEON: Dea Kenyon MD     ASSISTANT: Darian Sidhu.    ANESTHESIA: Spinal.    ESTIMATED BLOOD LOSS: 700 mL.    FINDINGS: Live viable female infant weighing 2 pounds 10 ounces with Apgar scores of 8 and 8 at one and five minutes, respectively. Venous pH gas was 7.39 and arterial blood gas was 7.39.    INDICATION FOR : Breech presentation and  labor.    COMPLICATIONS: None.    DESCRIPTION OF PROCEDURE: The patient presented 2 days prior to delivery with  premature rupture of membranes. She received 48 hours of magnesium and a betamethasone course. On the morning of her  section, she began to contract at approximately 5:00 a.m. and was changing her cervix at 3 cm, painfully yuki. She was at that time given the max load of 6 grams and taken to the OR for a primary lower transverse  section. After spinal anesthesia was found to be adequate, a Pfannenstiel skin incision was made, carried down to the fascia. The fascial incision was extended laterally with the Mcgill scissors bluntly. Rectus muscles were dissected off bluntly. Rectus muscles were  in the midline. Peritoneum was entered bluntly. Bladder blade was inserted. Bladder flap was easily created. Lower uterine segment was noted to be quite developed, so it was decided to do a transverse hysterotomy with a scalpel and extended bluntly. The buttocks were delivered. Both legs were delivered without difficulty. The infant  was then rotated to the left and left arm was swept across the body. Infant was then rotated to the right and right arm swept across the body. Infant's head was then delivered flexed position without difficulty. Infant cord clamping was delayed for 30 minutes. Cord was then clamped and cut. Infant handed to awaiting neonatologist. Placenta was delivered spontaneously intact with a 3 vessel cord. Cord gases were collected and sent. Uterus was exteriorized, cleared of all clots and debris. Hysterotomy was closed in a running locked fashion in 2 layers of 0 Monocryl. Excellent hemostasis was noted. The uterus was returned to the abdomen. Pericolic gutters were cleared of all clots and debris. Hysterotomy was reinspected and noted to be hemostatic. Peritoneum was reapproximated with 3-0 Monocryl. Fascia was closed with 0 Vicryl. Subcutaneous layer was reapproximated with 3-0 Monocryl and skin was closed with 4-0 Vicryl. Sponge, lap, and needle counts were correct x3. Patient tolerated the procedure well and went to recovery in stable condition.      SULTANA Platt:flash  D:   02/06/2017 07:26:19  T:   02/06/2017 11:44:38  Job ID:   [id number]  Document ID:   [id number]  cc:

## 2017-02-06 NOTE — NURSING NOTE
Rapid response called, they arrived at bsd at 0825, report was given ,  and  at bsd,  also remains at bsd,

## 2017-02-06 NOTE — PROGRESS NOTES
"OB Postoperative C/S    Called emergently to come evalutate patient for neuro status changes at 8:14 AM, immediately came to the bedside. Postoperative from C/S for labor w/ PPROM/breech at 28 5/7wk gest. Given magnesium sulfate for neuro protection. Anesthesia also at bedside. Pt awake but groggy and moving around in bed repeating \"no\". Had been unable to answer questions earlier. Stat CBC,CMP and ABG ordered. Vitals stable and lochia was scant.  at bedside (cardiologist). Stat Neurology consult requested and rapid response team called. Anestheia stated that pt was given IV phenergan intra-op and this could be a dystonic reaction from that. Gradually pt was more awake and able to answer orientation questions.  and rapid response RN doing neuro checks at bedside. He felt that this was likely a medication reaction and wanted Neuro consult cancelled.    Pt re-evealuated at 1030 and was more awake and oriented. Answered all questions appropriately. VSS, labs wnl. ABG normal. Did had T max of 102.8, ?realated to dystonic reaction and pt shaking or intrauterine infection from PPROM. Will start IV Kefzol and observe.  "

## 2017-02-06 NOTE — NURSING NOTE
called and informed of pt confused and unable to tell me her , where she is, this RN has not given pt demerol as ordered earlier due to pt confusion, this RN asked aa to come to bedside

## 2017-02-06 NOTE — NURSING NOTE
"Called down to room, Crying, \"I'm leaking a lot of fluid and some pressure\". Denies contractions.  Refused to be placed on monitor.  States, \"baby has been moving\".  Verbal reassurance given.  Lights off and encouraged to rest.    "

## 2017-02-06 NOTE — NURSING NOTE
at bsd and  arrived to bsd at 0816, VS reported to both, pt remains unable to tell RN , where she is at, using manual bp on arm and automatic bp cuff on lower leg, pt smacking lips together and mumbling, aa feels may be reaction to phenergan given in the OR, stat abg ordered, attempting to obtain cbc and cmp, lr bolus started per OB orders

## 2017-02-06 NOTE — ANESTHESIA POSTPROCEDURE EVALUATION
Patient: Anupama Arnold    Procedure Summary     Date Anesthesia Start Anesthesia Stop Room / Location    17 [id number] 0705  [facility]  /  [facility]       Procedure Diagnosis Surgeon Provider     SECTION PRIMARY (Bilateral Abdomen) (28 weeker) MD Flakito Benitez MD          Anesthesia Type: spinal  Last vitals  /52 (17)    Temp (!) 39.3 °C (102.8 °F) (17)    Pulse 109 (17)   Resp 18 (17)    SpO2 97 % (17)      Post Anesthesia Care and Evaluation    Patient location during evaluation: PACU  Patient participation: complete - patient participated  Level of consciousness: awake and alert  Pain management: adequate  Airway patency: patent  Anesthetic complications: No anesthetic complications    Cardiovascular status: acceptable  Respiratory status: acceptable  Hydration status: acceptable

## 2017-02-06 NOTE — ANESTHESIA PROCEDURE NOTES
Spinal Block    Patient location during procedure: OR  Start Time: 2/6/2017 6:11 AM  Indication:at surgeon's request  Performed By  Anesthesiologist: AGATHA GIORDANO  Preanesthetic Checklist  Completed: patient identified, site marked, surgical consent, pre-op evaluation, timeout performed, IV checked, risks and benefits discussed and monitors and equipment checked  Spinal Block Prep:  Patient Position:sitting  Sterile Tech:cap, gloves, gown, mask and sterile barriers  Prep:Chloraprep  Patient Monitoring:blood pressure monitoring, continuous pulse oximetry and EKG  Spinal Block Procedure  Approach:midline  Guidance:landmark technique and palpation technique  Location:L4-L5  Needle Type:Sprotte  Needle Gauge:24  Placement of Spinal needle event:cerebrospinal fluid  Fluid Appearance:clear  Post Assessment  Patient Tolerance:patient tolerated the procedure well with no apparent complications  Complications no  Additional Notes  bup 0.75 % w/ dex 1.8cc  10 mcg fent  duramorph 0.2 mg

## 2017-02-06 NOTE — NURSING NOTE
"Pt now shaking head back and forth again and stating \", no, no, no, smacking lips together, this RN asking pt to state , pt is not responding to this and will not answer RN's questions, pt pale, lips pale in color, will call AA to come to bedside, manual bp taken and wnl,   "

## 2017-02-07 LAB
BACTERIA SPEC AEROBE CULT: ABNORMAL
BASOPHILS # BLD AUTO: 0.01 10*3/MM3 (ref 0–0.2)
BASOPHILS NFR BLD AUTO: 0.1 % (ref 0–1.5)
DEPRECATED RDW RBC AUTO: 49.1 FL (ref 37–54)
EOSINOPHIL # BLD AUTO: 0.02 10*3/MM3 (ref 0–0.7)
EOSINOPHIL NFR BLD AUTO: 0.2 % (ref 0.3–6.2)
ERYTHROCYTE [DISTWIDTH] IN BLOOD BY AUTOMATED COUNT: 13.6 % (ref 11.7–13)
GRAM STN SPEC: ABNORMAL
GRAM STN SPEC: ABNORMAL
HCT VFR BLD AUTO: 29.7 % (ref 35.6–45.5)
HGB BLD-MCNC: 9.9 G/DL (ref 11.9–15.5)
IMM GRANULOCYTES # BLD: 0.11 10*3/MM3 (ref 0–0.03)
IMM GRANULOCYTES NFR BLD: 0.9 % (ref 0–0.5)
LYMPHOCYTES # BLD AUTO: 0.84 10*3/MM3 (ref 0.9–4.8)
LYMPHOCYTES NFR BLD AUTO: 6.9 % (ref 19.6–45.3)
MCH RBC QN AUTO: 33.1 PG (ref 26.9–32)
MCHC RBC AUTO-ENTMCNC: 33.3 G/DL (ref 32.4–36.3)
MCV RBC AUTO: 99.3 FL (ref 80.5–98.2)
MONOCYTES # BLD AUTO: 0.79 10*3/MM3 (ref 0.2–1.2)
MONOCYTES NFR BLD AUTO: 6.5 % (ref 5–12)
NEUTROPHILS # BLD AUTO: 10.37 10*3/MM3 (ref 1.9–8.1)
NEUTROPHILS NFR BLD AUTO: 85.4 % (ref 42.7–76)
PLATELET # BLD AUTO: 171 10*3/MM3 (ref 140–500)
PMV BLD AUTO: 9.6 FL (ref 6–12)
RBC # BLD AUTO: 2.99 10*6/MM3 (ref 3.9–5.2)
WBC NRBC COR # BLD: 12.14 10*3/MM3 (ref 4.5–10.7)

## 2017-02-07 PROCEDURE — 63710000001 DIPHENHYDRAMINE PER 50 MG: Performed by: OBSTETRICS & GYNECOLOGY

## 2017-02-07 PROCEDURE — 25010000003 CEFAZOLIN IN DEXTROSE 2-4 GM/100ML-% SOLUTION: Performed by: OBSTETRICS & GYNECOLOGY

## 2017-02-07 PROCEDURE — 94799 UNLISTED PULMONARY SVC/PX: CPT

## 2017-02-07 PROCEDURE — 85025 COMPLETE CBC W/AUTO DIFF WBC: CPT | Performed by: OBSTETRICS & GYNECOLOGY

## 2017-02-07 PROCEDURE — 0503F POSTPARTUM CARE VISIT: CPT | Performed by: OBSTETRICS & GYNECOLOGY

## 2017-02-07 RX ADMIN — OXYCODONE HYDROCHLORIDE AND ACETAMINOPHEN 1 TABLET: 5; 325 TABLET ORAL at 16:29

## 2017-02-07 RX ADMIN — DOCUSATE SODIUM 100 MG: 100 CAPSULE, LIQUID FILLED ORAL at 17:35

## 2017-02-07 RX ADMIN — DOCUSATE SODIUM 100 MG: 100 CAPSULE, LIQUID FILLED ORAL at 08:22

## 2017-02-07 RX ADMIN — Medication 1 TABLET: at 08:22

## 2017-02-07 RX ADMIN — OXYCODONE HYDROCHLORIDE AND ACETAMINOPHEN 1 TABLET: 5; 325 TABLET ORAL at 12:08

## 2017-02-07 RX ADMIN — OXYCODONE HYDROCHLORIDE AND ACETAMINOPHEN 1 TABLET: 5; 325 TABLET ORAL at 03:27

## 2017-02-07 RX ADMIN — DIPHENHYDRAMINE HYDROCHLORIDE 25 MG: 25 CAPSULE ORAL at 23:00

## 2017-02-07 RX ADMIN — CEFAZOLIN SODIUM 2 G: 2 INJECTION, SOLUTION INTRAVENOUS at 03:35

## 2017-02-07 RX ADMIN — IBUPROFEN 800 MG: 800 TABLET ORAL at 16:29

## 2017-02-07 RX ADMIN — OXYCODONE HYDROCHLORIDE AND ACETAMINOPHEN 1 TABLET: 5; 325 TABLET ORAL at 20:33

## 2017-02-07 RX ADMIN — IBUPROFEN 800 MG: 800 TABLET ORAL at 07:21

## 2017-02-07 RX ADMIN — OXYCODONE HYDROCHLORIDE AND ACETAMINOPHEN 1 TABLET: 5; 325 TABLET ORAL at 07:40

## 2017-02-07 NOTE — LACTATION NOTE
"P4. Mom pumping for 28 week daughter at [facility]. Mom was concerned that she had gotten 18cc when she first pumped but now only getting 1-2cc. Reassured her that the 18cc was most likely the \"bolus\" available after delivery and it is normal for that to decrease before  it starts increasing. Mom feels relieved and said that was her only concern at this time. Has LC #    "

## 2017-02-07 NOTE — PLAN OF CARE
Problem: Patient Care Overview (Adult)  Goal: Plan of Care Review  Outcome: Ongoing (interventions implemented as appropriate)    Problem: Postpartum ( Delivery) (Adult)  Goal: Signs and Symptoms of Listed Potential Problems Will be Absent or Manageable (Postpartum)  Outcome: Ongoing (interventions implemented as appropriate)

## 2017-02-07 NOTE — PROGRESS NOTES
Section PROGRESS NOTE    Post-Op Day 1 S/P   Subjective     Patient reports:No more issues since the dystonic reaction to phenergan yesterday am.  Pain is well controlled with Motrin and Percocet.  She is  Ambulating, but very slowly.   Voiding - without difficulty.  Tolerating diet, flatus reported..  Vaginal bleeding is light.                Breast Feeding: Pumping.    Objective                Vitals: Vital Signs Range for the last 24 hours  Temperature: Temp:  [97.1 °F (36.2 °C)-98.9 °F (37.2 °C)] 98.3 °F (36.8 °C)   Temp Source: Temp src: Oral   BP: BP: ()/(58-77) 120/77   Pulse: Heart Rate:  [] 101   Respirations: Resp:  [16-20] 16   SPO2: SpO2:  [94 %-100 %] 97 %   O2 Amount (l/min):     O2 Devices O2 Device: room air;aerosol mask   Weight:              Physical Exam      Lungs clear to auscultation bilaterally.   Abdomen Flat, Soft,  Bowel sounds present. Has erythema reaction to adhesive in the upper abdomen.   Incision  well approximated, has erythema around the right side of the incision, ? Tape or cellulitis, removed the proximate strips and highlighted the edges with a sharpie.   Extremities Calves not tender ; 1+ edema.                              WBC 12.14   H/H 9.9 / 29.7   Plat 171.                   85N,  7L,  About same as before surgery.                    Assessment/Plan      Active Problems:    Advanced maternal age in multigravida    Pregnancy     premature rupture of membranes (PPROM) with unknown onset of labor     labor in third trimester with  delivery      Assessment:    Anupama Arnold is Day 1  post-partum  , Low Transverse    .                ? Cellulitis, or tape reaction.    Plan:  remove dressing, continue post op care and , recheck incision later.        Jimmy Rodriguez MD  2017  5:11 PM

## 2017-02-07 NOTE — PLAN OF CARE
Problem: Patient Care Overview (Adult)  Goal: Plan of Care Review  Outcome: Ongoing (interventions implemented as appropriate)    Problem: Postpartum ( Delivery) (Adult)  Goal: Signs and Symptoms of Listed Potential Problems Will be Absent or Manageable (Postpartum)  Outcome: Ongoing (interventions implemented as appropriate)    17 0621   Postpartum ( Delivery)   Problems Assessed (Postpartum ) all   Problems Present (Postpartum ) pain

## 2017-02-08 LAB
BACTERIA SPEC ANAEROBE CULT: NORMAL
BASOPHILS # BLD AUTO: 0.02 10*3/MM3 (ref 0–0.2)
BASOPHILS NFR BLD AUTO: 0.2 % (ref 0–1.5)
DEPRECATED RDW RBC AUTO: 49.4 FL (ref 37–54)
EOSINOPHIL # BLD AUTO: 0.08 10*3/MM3 (ref 0–0.7)
EOSINOPHIL NFR BLD AUTO: 0.7 % (ref 0.3–6.2)
ERYTHROCYTE [DISTWIDTH] IN BLOOD BY AUTOMATED COUNT: 13.5 % (ref 11.7–13)
HCT VFR BLD AUTO: 31.3 % (ref 35.6–45.5)
HGB BLD-MCNC: 10.3 G/DL (ref 11.9–15.5)
IMM GRANULOCYTES # BLD: 0.24 10*3/MM3 (ref 0–0.03)
IMM GRANULOCYTES NFR BLD: 2 % (ref 0–0.5)
LYMPHOCYTES # BLD AUTO: 1.93 10*3/MM3 (ref 0.9–4.8)
LYMPHOCYTES NFR BLD AUTO: 16.2 % (ref 19.6–45.3)
MCH RBC QN AUTO: 32.9 PG (ref 26.9–32)
MCHC RBC AUTO-ENTMCNC: 32.9 G/DL (ref 32.4–36.3)
MCV RBC AUTO: 100 FL (ref 80.5–98.2)
MONOCYTES # BLD AUTO: 1 10*3/MM3 (ref 0.2–1.2)
MONOCYTES NFR BLD AUTO: 8.4 % (ref 5–12)
NEUTROPHILS # BLD AUTO: 8.67 10*3/MM3 (ref 1.9–8.1)
NEUTROPHILS NFR BLD AUTO: 72.5 % (ref 42.7–76)
PLATELET # BLD AUTO: 240 10*3/MM3 (ref 140–500)
PMV BLD AUTO: 9.8 FL (ref 6–12)
RBC # BLD AUTO: 3.13 10*6/MM3 (ref 3.9–5.2)
WBC NRBC COR # BLD: 11.94 10*3/MM3 (ref 4.5–10.7)

## 2017-02-08 PROCEDURE — 63710000001 DIPHENHYDRAMINE PER 50 MG: Performed by: OBSTETRICS & GYNECOLOGY

## 2017-02-08 PROCEDURE — 94799 UNLISTED PULMONARY SVC/PX: CPT

## 2017-02-08 PROCEDURE — 85025 COMPLETE CBC W/AUTO DIFF WBC: CPT | Performed by: OBSTETRICS & GYNECOLOGY

## 2017-02-08 RX ADMIN — IBUPROFEN 800 MG: 800 TABLET ORAL at 00:29

## 2017-02-08 RX ADMIN — IBUPROFEN 800 MG: 800 TABLET ORAL at 17:40

## 2017-02-08 RX ADMIN — OXYCODONE HYDROCHLORIDE AND ACETAMINOPHEN 2 TABLET: 5; 325 TABLET ORAL at 00:29

## 2017-02-08 RX ADMIN — OXYCODONE HYDROCHLORIDE AND ACETAMINOPHEN 1 TABLET: 5; 325 TABLET ORAL at 04:58

## 2017-02-08 RX ADMIN — DIPHENHYDRAMINE HYDROCHLORIDE 25 MG: 25 CAPSULE ORAL at 20:04

## 2017-02-08 RX ADMIN — SIMETHICONE CHEW TAB 80 MG 80 MG: 80 TABLET ORAL at 17:40

## 2017-02-08 RX ADMIN — SIMETHICONE CHEW TAB 80 MG 80 MG: 80 TABLET ORAL at 10:59

## 2017-02-08 RX ADMIN — AMPICILLIN SODIUM 2 G: 2 INJECTION, POWDER, FOR SOLUTION INTRAMUSCULAR; INTRAVENOUS at 17:39

## 2017-02-08 RX ADMIN — IBUPROFEN 800 MG: 800 TABLET ORAL at 10:59

## 2017-02-08 RX ADMIN — DOCUSATE SODIUM 100 MG: 100 CAPSULE, LIQUID FILLED ORAL at 17:40

## 2017-02-08 RX ADMIN — OXYCODONE HYDROCHLORIDE AND ACETAMINOPHEN 2 TABLET: 5; 325 TABLET ORAL at 10:59

## 2017-02-08 RX ADMIN — AMPICILLIN SODIUM 2 G: 2 INJECTION, POWDER, FOR SOLUTION INTRAMUSCULAR; INTRAVENOUS at 10:51

## 2017-02-08 RX ADMIN — IBUPROFEN 800 MG: 800 TABLET ORAL at 23:21

## 2017-02-08 RX ADMIN — Medication 1 TABLET: at 10:59

## 2017-02-08 RX ADMIN — OXYCODONE HYDROCHLORIDE AND ACETAMINOPHEN 2 TABLET: 5; 325 TABLET ORAL at 20:04

## 2017-02-08 RX ADMIN — DOCUSATE SODIUM 100 MG: 100 CAPSULE, LIQUID FILLED ORAL at 10:59

## 2017-02-08 RX ADMIN — BISACODYL 10 MG: 10 SUPPOSITORY RECTAL at 18:51

## 2017-02-08 RX ADMIN — AMPICILLIN SODIUM 2 G: 2 INJECTION, POWDER, FOR SOLUTION INTRAMUSCULAR; INTRAVENOUS at 23:20

## 2017-02-08 NOTE — PROGRESS NOTES
Brief follow up note:    Patient is doing well, moving better, afebrile, and WBC is slightly lower...  BUT the erythema has extended around her right side more and has extended a little past the outline, and now feels indurated and warm compared to yesterday.    Discussed with patient, we will place a saline lock and start Ampicillin 2 gm q 6h, expecting we can convert to po when she goes home.

## 2017-02-08 NOTE — PROGRESS NOTES
[facility]     2017    Name:Anupama Arnold    MR#:[id number]     PROGRESS NOTE:  Post-Op 2 S/P    HD:5    Subjective   36 y.o. yo Female  s/p CS at 28w5d doing well. Pain well controlled. Tolerating regular diet and having flatus. Lochia normal.     Patient Active Problem List   Diagnosis   • Advanced maternal age in multigravida   • History of macrosomia in infant in prior pregnancy, currently pregnant   • Pregnancy   •  premature rupture of membranes (PPROM) with unknown onset of labor   •  labor in third trimester with  delivery        Objective    Vitals  Temp:  Temp:  [98.2 °F (36.8 °C)-98.3 °F (36.8 °C)] 98.2 °F (36.8 °C)  Temp src: Oral  BP:  BP: (120-122)/(77) 122/77  Pulse:  Heart Rate:  [] 85  RR:   Resp:  [16] 16  Weight: 185 lb (83.9 kg)  BMI:  Body mass index is 30.79 kg/(m^2).      General Awake, alert, no distress  Abdomen Soft, non-distended, fundus firm, 4 below umbilicus, appropriately tender  Incision  Intact, some erythema (inferior margin and above the tape) or exudate  Extremities Calves NT bilaterally         I/O last 3 completed shifts:  In: 2420 [P.O.:1470]  Out: 3950 [Urine:3950]    LABS:   Lab Results   Component Value Date    WBC 11.94 (H) 2017    HGB 10.3 (L) 2017    HCT 31.3 (L) 2017    .0 (H) 2017     2017       Infant: female       Assessment  1.  POD 2  2.  Some incisional erythema - favor tape reaction over cellulitis.  NO abx, continue observation, no more tape, WBC improved    Plan: Doing well.  Routine postoperative care      Active Problems:   None      Quinn Brown MD  2017 7:42 AM

## 2017-02-08 NOTE — LACTATION NOTE
Pt continues to use HGP. Starting to get and increasing amount of colostrum. Rx for personal pump and HGP given. Pt to call LC as needed.

## 2017-02-08 NOTE — PLAN OF CARE
Problem: Patient Care Overview (Adult)  Goal: Plan of Care Review  Outcome: Ongoing (interventions implemented as appropriate)    17 0030   Coping/Psychosocial Response Interventions   Plan Of Care Reviewed With patient   Patient Care Overview   Progress progress toward functional goals is gradual   Outcome Evaluation   Outcome Summary/Follow up Plan vss stable, head to toe wnl at this time, pain being controlled with otc motrin and percocet, pt is voiding well and ambulating well. incision is cdi, rash where tegederm was is noted around incision, md aware, no s/s of infection at this time, cbc to be drawn in am, pt pumping and storing colostrum for infant, mother very emotional earlier but doing better at this time after therapeutic communication and presence       Goal: Adult Individualization and Mutuality  Outcome: Ongoing (interventions implemented as appropriate)    17 0649 17 0655   Individualization   Patient Specific Preferences --  Prolong pregnancy   Patient Specific Goals --  Breastfeeding   Patient Specific Interventions --  Mag, Antibiotics   Mutuality/Individual Preferences   What Anxieties, Fears or Concerns Do You Have About Your Health or Care? Having a  infant --    What Questions Do You Have About Your Health or Care? --  Can I take a shower?   What Information Would Help Us Give You More Personalized Care? Father in law is Dr. Arnold with [facility]- Neonatologist --        Goal: Discharge Needs Assessment  Outcome: Ongoing (interventions implemented as appropriate)    17 1248 17 1000 17 1822   Discharge Needs Assessment   Concerns To Be Addressed --  --  no discharge needs identified   Living Environment   Transportation Available --  car --    Self-Care   Equipment Currently Used at Home none --  --          Problem: Breastfeeding (Adult,NICU,,Obstetrics,Pediatric)  Goal: Signs and Symptoms of Listed Potential Problems Will be Absent or Manageable  (Breastfeeding)  Outcome: Ongoing (interventions implemented as appropriate)    02/08/17 0201   Breastfeeding   Problems Assessed (Breastfeeding) all   Problems Present (Breastfeeding) none

## 2017-02-08 NOTE — PLAN OF CARE
Problem: Breastfeeding (Adult,NICU,[place],Obstetrics,Pediatric)  Goal: Signs and Symptoms of Listed Potential Problems Will be Absent or Manageable (Breastfeeding)  Outcome: Ongoing (interventions implemented as appropriate)

## 2017-02-09 PROCEDURE — 63710000001 DIPHENHYDRAMINE PER 50 MG: Performed by: OBSTETRICS & GYNECOLOGY

## 2017-02-09 PROCEDURE — 94799 UNLISTED PULMONARY SVC/PX: CPT

## 2017-02-09 RX ADMIN — OXYCODONE HYDROCHLORIDE AND ACETAMINOPHEN 1 TABLET: 5; 325 TABLET ORAL at 03:35

## 2017-02-09 RX ADMIN — AMPICILLIN SODIUM 2 G: 2 INJECTION, POWDER, FOR SOLUTION INTRAMUSCULAR; INTRAVENOUS at 16:59

## 2017-02-09 RX ADMIN — AMPICILLIN SODIUM 2 G: 2 INJECTION, POWDER, FOR SOLUTION INTRAMUSCULAR; INTRAVENOUS at 05:55

## 2017-02-09 RX ADMIN — AMPICILLIN SODIUM 2 G: 2 INJECTION, POWDER, FOR SOLUTION INTRAMUSCULAR; INTRAVENOUS at 12:04

## 2017-02-09 RX ADMIN — OXYCODONE HYDROCHLORIDE AND ACETAMINOPHEN 1 TABLET: 5; 325 TABLET ORAL at 12:55

## 2017-02-09 RX ADMIN — Medication 1 TABLET: at 08:50

## 2017-02-09 RX ADMIN — DOCUSATE SODIUM 100 MG: 100 CAPSULE, LIQUID FILLED ORAL at 17:50

## 2017-02-09 RX ADMIN — IBUPROFEN 800 MG: 800 TABLET ORAL at 08:50

## 2017-02-09 RX ADMIN — DOCUSATE SODIUM 100 MG: 100 CAPSULE, LIQUID FILLED ORAL at 08:50

## 2017-02-09 RX ADMIN — AMPICILLIN SODIUM 2 G: 2 INJECTION, POWDER, FOR SOLUTION INTRAMUSCULAR; INTRAVENOUS at 23:53

## 2017-02-09 RX ADMIN — OXYCODONE HYDROCHLORIDE AND ACETAMINOPHEN 2 TABLET: 5; 325 TABLET ORAL at 20:36

## 2017-02-09 RX ADMIN — OXYCODONE HYDROCHLORIDE AND ACETAMINOPHEN 1 TABLET: 5; 325 TABLET ORAL at 08:50

## 2017-02-09 RX ADMIN — DIPHENHYDRAMINE HYDROCHLORIDE 25 MG: 25 CAPSULE ORAL at 20:36

## 2017-02-09 RX ADMIN — IBUPROFEN 800 MG: 800 TABLET ORAL at 16:59

## 2017-02-09 NOTE — PLAN OF CARE
Problem: Breastfeeding (Adult,NICU,[place],Obstetrics,Pediatric)  Goal: Signs and Symptoms of Listed Potential Problems Will be Absent or Manageable (Breastfeeding)  Outcome: Ongoing (interventions implemented as appropriate)

## 2017-02-09 NOTE — PROGRESS NOTES
[facility]   PROGRESS NOTE    Post-Op Day 3 S/P   Subjective     Patient reports:  Her incision feels better today she notices less warmth.  The baby is doing well in the NICU.  Her pain is well controlled with oral medication.  Her bleeding is normal.  She is passing gas.  She is ambulating without assistance.    The patient was started on IV ampicillin yesterday due to possible cellulitis.    Objective      Vitals: Vital Signs Range for the last 24 hours  Temperature: Temp:  [97.6 °F (36.4 °C)-98.1 °F (36.7 °C)] 98.1 °F (36.7 °C)       BP: BP: (108-118)/(70-75) 118/75   Pulse: Heart Rate:  [75-83] 75   Respirations: Resp:  [16-18] 16                            Physical exam   General-  no acute distress.   Abdomen- soft, nontender, nondistended.  Fundus is firm.   Incision -  clean dry and intact.  The area where the erythema had been noted yesterday and outlined by a marker appears just to have some dark brown discoloration consistent with possible ecchymosis             there is also ecchymosis below the incision.   Lower extremities- nontender bilaterally, 1+ edema      Results from last 7 days  Lab Units 17  0551   WBC 10*3/mm3 11.94*   HEMOGLOBIN g/dL 10.3*   HEMATOCRIT % 31.3*   PLATELETS 10*3/mm3 240         Assessment/Plan      Active Problems:    Advanced maternal age in multigravida    Pregnancy     premature rupture of membranes (PPROM) with unknown onset of labor     labor in third trimester with  delivery      Assessment:    Anupama Arnold is Day 3  post-op from      Incision has improved on IV ampicillin I will continue the plan, per the chart, to continue IV ampicillin until tomorrow and then change to orals.  I will recheck a white count in the morning     Plan:  continue post op care, encouraged ambulation.  IV ampicillin for incisional cellulitis        Yeyo Claudio MD  2017  12:56 PM

## 2017-02-09 NOTE — LACTATION NOTE
Reviewed pumping plan with pt. Pt is hopeful once baby nursing she will not need to pump as much. Discussed that for some time she will need to pump after feedings. Pt to call LC as needed.

## 2017-02-10 VITALS
SYSTOLIC BLOOD PRESSURE: 121 MMHG | RESPIRATION RATE: 16 BRPM | OXYGEN SATURATION: 97 % | DIASTOLIC BLOOD PRESSURE: 68 MMHG | HEIGHT: 65 IN | TEMPERATURE: 98.6 F | WEIGHT: 185 LBS | BODY MASS INDEX: 30.82 KG/M2 | HEART RATE: 70 BPM

## 2017-02-10 PROBLEM — O42.919 PRETERM PREMATURE RUPTURE OF MEMBRANES (PPROM) WITH UNKNOWN ONSET OF LABOR: Status: RESOLVED | Noted: 2017-02-03 | Resolved: 2017-02-10

## 2017-02-10 PROBLEM — Z34.90 PREGNANCY: Status: RESOLVED | Noted: 2017-02-03 | Resolved: 2017-02-10

## 2017-02-10 LAB
BASOPHILS # BLD AUTO: 0.03 10*3/MM3 (ref 0–0.2)
BASOPHILS NFR BLD AUTO: 0.4 % (ref 0–1.5)
DEPRECATED RDW RBC AUTO: 47.3 FL (ref 37–54)
EOSINOPHIL # BLD AUTO: 0.1 10*3/MM3 (ref 0–0.7)
EOSINOPHIL NFR BLD AUTO: 1.2 % (ref 0.3–6.2)
ERYTHROCYTE [DISTWIDTH] IN BLOOD BY AUTOMATED COUNT: 12.9 % (ref 11.7–13)
HCT VFR BLD AUTO: 30.5 % (ref 35.6–45.5)
HGB BLD-MCNC: 10 G/DL (ref 11.9–15.5)
IMM GRANULOCYTES # BLD: 0.34 10*3/MM3 (ref 0–0.03)
IMM GRANULOCYTES NFR BLD: 4.1 % (ref 0–0.5)
LYMPHOCYTES # BLD AUTO: 2.58 10*3/MM3 (ref 0.9–4.8)
LYMPHOCYTES NFR BLD AUTO: 31.5 % (ref 19.6–45.3)
MCH RBC QN AUTO: 33 PG (ref 26.9–32)
MCHC RBC AUTO-ENTMCNC: 32.8 G/DL (ref 32.4–36.3)
MCV RBC AUTO: 100.7 FL (ref 80.5–98.2)
MONOCYTES # BLD AUTO: 0.45 10*3/MM3 (ref 0.2–1.2)
MONOCYTES NFR BLD AUTO: 5.5 % (ref 5–12)
NEUTROPHILS # BLD AUTO: 4.7 10*3/MM3 (ref 1.9–8.1)
NEUTROPHILS NFR BLD AUTO: 57.3 % (ref 42.7–76)
PLATELET # BLD AUTO: 244 10*3/MM3 (ref 140–500)
PMV BLD AUTO: 9.5 FL (ref 6–12)
RBC # BLD AUTO: 3.03 10*6/MM3 (ref 3.9–5.2)
WBC NRBC COR # BLD: 8.2 10*3/MM3 (ref 4.5–10.7)

## 2017-02-10 PROCEDURE — 85025 COMPLETE CBC W/AUTO DIFF WBC: CPT | Performed by: OBSTETRICS & GYNECOLOGY

## 2017-02-10 RX ORDER — OXYCODONE HYDROCHLORIDE AND ACETAMINOPHEN 5; 325 MG/1; MG/1
1-2 TABLET ORAL EVERY 4 HOURS PRN
Qty: 30 TABLET | Refills: 0 | Status: SHIPPED | OUTPATIENT
Start: 2017-02-10 | End: 2017-02-16

## 2017-02-10 RX ORDER — CEPHALEXIN 500 MG/1
500 CAPSULE ORAL 4 TIMES DAILY
Qty: 28 CAPSULE | Refills: 0 | Status: SHIPPED | OUTPATIENT
Start: 2017-02-10 | End: 2017-02-17

## 2017-02-10 RX ORDER — ASCORBIC ACID, CALCIUM CITRATE, IRON, VITAMIN D, DL- ALPHA- TOCOPHEROL ACETATE, THIAMINE, RIBOFLAVIN, NIACINAMIDE, PYRIDOXINE HYDROCHLORIDE, FOLIC ACID, IODINE, ZINC, COPPER, DOCUSATE SODIUM, DOCONEXENT AND ICOSAPENT
1 KIT DAILY
Qty: 30 TABLET | Refills: 6 | Status: SHIPPED | OUTPATIENT
Start: 2017-02-10 | End: 2018-02-10

## 2017-02-10 RX ORDER — IBUPROFEN 600 MG/1
600 TABLET ORAL EVERY 6 HOURS PRN
Qty: 30 TABLET | Refills: 0 | Status: SHIPPED | OUTPATIENT
Start: 2017-02-10 | End: 2018-10-03

## 2017-02-10 RX ADMIN — SIMETHICONE CHEW TAB 80 MG 80 MG: 80 TABLET ORAL at 10:28

## 2017-02-10 RX ADMIN — OXYCODONE HYDROCHLORIDE AND ACETAMINOPHEN 2 TABLET: 5; 325 TABLET ORAL at 01:07

## 2017-02-10 RX ADMIN — AMPICILLIN SODIUM 2 G: 2 INJECTION, POWDER, FOR SOLUTION INTRAMUSCULAR; INTRAVENOUS at 05:55

## 2017-02-10 RX ADMIN — OXYCODONE HYDROCHLORIDE AND ACETAMINOPHEN 1 TABLET: 5; 325 TABLET ORAL at 06:05

## 2017-02-10 RX ADMIN — IBUPROFEN 800 MG: 800 TABLET ORAL at 10:28

## 2017-02-10 RX ADMIN — OXYCODONE HYDROCHLORIDE AND ACETAMINOPHEN 1 TABLET: 5; 325 TABLET ORAL at 10:28

## 2017-02-10 RX ADMIN — DOCUSATE SODIUM 100 MG: 100 CAPSULE, LIQUID FILLED ORAL at 10:28

## 2017-02-10 RX ADMIN — IBUPROFEN 800 MG: 800 TABLET ORAL at 01:07

## 2017-02-10 NOTE — PLAN OF CARE
Problem: Breastfeeding (Adult,NICU,[place],Obstetrics,Pediatric)  Goal: Signs and Symptoms of Listed Potential Problems Will be Absent or Manageable (Breastfeeding)  Outcome: Ongoing (interventions implemented as appropriate)

## 2017-02-10 NOTE — DISCHARGE SUMMARY
[facility]   PROGRESS NOTE    Post-Op Day 4  S/P   Subjective     Patient reports:  Pain is well controlled with prescription NSAID's including motrin and narcotic analgesics including percocet.  She is   ambulating. Tolerating diet. Tolerating po -- normal.  Intake -- c/o of tolerating po solids.   Voiding - without difficulty; flatus reported..  Vaginal bleeding is light. Pt reports that erythema is improved w/ antibiotics.      Objective      Vitals: Vital Signs Range for the last 24 hours  Temperature: Temp:  [98.3 °F (36.8 °C)-98.6 °F (37 °C)] 98.6 °F (37 °C)   Temp Source: Temp src: Oral   BP: BP: (121-131)/(68-83) 121/68   Pulse: Heart Rate:  [70-86] 70   Respirations: Resp:  [16] 16   SPO2:     O2 Amount (l/min):     O2 Devices O2 Device: room air   Weight:              Physical Exam    Lungs     Abdomen Soft, non-tender, normal bowel sounds; no bruits, organomegaly or masses.  Skin: ecchymosis- under incision, some erythema around incision   Incision  healing well, no drainage, well approximated   Extremities extremities normal, atraumatic, no cyanosis or edema     I reviewed the patient's new clinical results.    Assessment/Plan      Active Problems:    Advanced maternal age in multigravida    Pregnancy     premature rupture of membranes (PPROM) with unknown onset of labor     labor in third trimester with  delivery      Assessment:    Anupama Arnold is Day 4  post-partum  , Low Transverse    .       Plan:  plan for discharge today.  Start oral antibiotics for mild cellulitis      Debbie Garrett MD  2/10/2017  9:54 AM

## 2017-02-10 NOTE — NURSING NOTE
Pt iv slightly pink and bothered her with flushing, pt requested it be removed and refused to be restuck by iv therapy at this time since she is anticipating discharge with po abt

## 2017-02-13 LAB
MYCOPLASMA HOMINIS: NEGATIVE
UREAPLASMA UREALYTICUM: NEGATIVE

## 2017-02-14 ENCOUNTER — TELEPHONE (OUTPATIENT)
Dept: OBSTETRICS AND GYNECOLOGY | Age: 37
End: 2017-02-14

## 2017-02-14 NOTE — TELEPHONE ENCOUNTER
Pt has c-sec 02/06/17, states shes not wearing the girdle she was sent home with, feels fine, is this ok? Pt also needs 2 wk PP on wk of 20th and no openings, when should we work her in? Offered 17th, pt doesn't have ride that week.    Pt # [phone]

## 2017-02-23 ENCOUNTER — POSTPARTUM VISIT (OUTPATIENT)
Dept: OBSTETRICS AND GYNECOLOGY | Age: 37
End: 2017-02-23

## 2017-02-23 VITALS
SYSTOLIC BLOOD PRESSURE: 122 MMHG | DIASTOLIC BLOOD PRESSURE: 74 MMHG | HEIGHT: 65 IN | BODY MASS INDEX: 29.42 KG/M2 | WEIGHT: 176.6 LBS

## 2017-02-23 DIAGNOSIS — O99.013 ANEMIA AFFECTING PREGNANCY IN THIRD TRIMESTER: Primary | ICD-10-CM

## 2017-02-23 DIAGNOSIS — Z09 POSTOP CHECK: ICD-10-CM

## 2017-02-23 LAB
BASOPHILS # BLD AUTO: 0.02 10*3/MM3 (ref 0–0.2)
BASOPHILS NFR BLD AUTO: 0.3 % (ref 0–1.5)
EOSINOPHIL # BLD AUTO: 0.08 10*3/MM3 (ref 0–0.7)
EOSINOPHIL NFR BLD AUTO: 1.2 % (ref 0.3–6.2)
ERYTHROCYTE [DISTWIDTH] IN BLOOD BY AUTOMATED COUNT: 12.7 % (ref 11.7–13)
HCT VFR BLD AUTO: 34.8 % (ref 35.6–45.5)
HGB BLD-MCNC: 11.6 G/DL (ref 11.9–15.5)
IMM GRANULOCYTES # BLD: 0 10*3/MM3 (ref 0–0.03)
IMM GRANULOCYTES NFR BLD: 0 % (ref 0–0.5)
LYMPHOCYTES # BLD AUTO: 2.22 10*3/MM3 (ref 0.9–4.8)
LYMPHOCYTES NFR BLD AUTO: 32.3 % (ref 19.6–45.3)
MCH RBC QN AUTO: 32.6 PG (ref 26.9–32)
MCHC RBC AUTO-ENTMCNC: 33.3 G/DL (ref 32.4–36.3)
MCV RBC AUTO: 97.8 FL (ref 80.5–98.2)
MONOCYTES # BLD AUTO: 0.32 10*3/MM3 (ref 0.2–1.2)
MONOCYTES NFR BLD AUTO: 4.7 % (ref 5–12)
NEUTROPHILS # BLD AUTO: 4.24 10*3/MM3 (ref 1.9–8.1)
NEUTROPHILS NFR BLD AUTO: 61.5 % (ref 42.7–76)
PLATELET # BLD AUTO: 528 10*3/MM3 (ref 140–500)
RBC # BLD AUTO: 3.56 10*6/MM3 (ref 3.9–5.2)
WBC # BLD AUTO: 6.88 10*3/MM3 (ref 4.5–10.7)

## 2017-02-23 NOTE — PROGRESS NOTES
"Subjective   Anupama Arnold is a 36 y.o. female who presents for a postpartum visit. She is 1 weeks postpartum following a low cervical transverse  section. I have fully reviewed the prenatal and intrapartum course. Postpartum course has been uneventful. Baby is feeding by breast. Bleeding has been normal in amount and decreasing.. Bowel function is normal. Bladder function is normal. Patient not sexually active at this time. Contraception method is discussed. Postpartum depression screening: negative.    The following portions of the patient's history were reviewed and updated as appropriate: allergies, current medications, past family history, past medical history, past social history, past surgical history and problem list.    Review of Systems  A comprehensive review of systems was negative.    Objective   Visit Vitals   • /74   • Ht 65\" (165.1 cm)   • Wt 176 lb 9.6 oz (80.1 kg)   • LMP 2016   • Breastfeeding Yes   • BMI 29.39 kg/m2      General:  alert    Breasts:  normal       Heart:  regular rate and rhytm   Abdomen: Findings  incision is healing quite nicely     Vulva:    Vagina:    Cervix:     Corpus:    Adnexa:           Assessment/Plan      patient is 1 week status post low transverse  at 28+ weeks with spontaneous rupture membranes.  She had had a fall vaginal swab approximately a week before delivery which was negative.  She only had some staph coagulase-negative probe from the wound.  There was some evidence of early vasculitis and inflammation of the chorion.     her daughter is doing quite well.  She states she physically overdid when she got home despite restrictions that were given.  We are going to check a CBC, work and her continue to have normal postoperative activity restrictions and I reviewed those.  Patient will call us with any proms questions concerns otherwise return in 4 weeks.         EMR Dragon/ Transcription disclaimer:  Much of the encounter note is an " electronic transcription/translation of spoken language to printed text. The electronic translation of spoken language may permit erroneous, or at times, nonessential words or phrases to be inadvertently transcribes; Although i have reviewed the note for such errors, some may still exist.

## 2017-03-27 ENCOUNTER — POSTPARTUM VISIT (OUTPATIENT)
Dept: OBSTETRICS AND GYNECOLOGY | Age: 37
End: 2017-03-27

## 2017-03-27 VITALS
BODY MASS INDEX: 28.49 KG/M2 | SYSTOLIC BLOOD PRESSURE: 108 MMHG | DIASTOLIC BLOOD PRESSURE: 70 MMHG | HEIGHT: 65 IN | WEIGHT: 171 LBS

## 2017-03-27 PROCEDURE — 0503F POSTPARTUM CARE VISIT: CPT | Performed by: OBSTETRICS & GYNECOLOGY

## 2017-03-27 RX ORDER — FEXOFENADINE HCL AND PSEUDOEPHEDRINE HCI 180; 240 MG/1; MG/1
1 TABLET, EXTENDED RELEASE ORAL
COMMUNITY
End: 2018-10-03 | Stop reason: SDUPTHER
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
## 2017-03-29 LAB
CYTOLOGIST CVX/VAG CYTO: NORMAL
CYTOLOGY CVX/VAG DOC THIN PREP: NORMAL
DX ICD CODE: NORMAL
HIV 1 & 2 AB SER-IMP: NORMAL
HPV I/H RISK 4 DNA CVX QL PROBE+SIG AMP: NEGATIVE
OTHER STN SPEC: NORMAL
PATH REPORT.FINAL DX SPEC: NORMAL
STAT OF ADQ CVX/VAG CYTO-IMP: NORMAL

## 2018-04-11 ENCOUNTER — OFFICE VISIT (OUTPATIENT)
Dept: OBSTETRICS AND GYNECOLOGY | Age: 38
End: 2018-04-11

## 2018-04-11 VITALS
BODY MASS INDEX: 23.32 KG/M2 | SYSTOLIC BLOOD PRESSURE: 102 MMHG | DIASTOLIC BLOOD PRESSURE: 68 MMHG | HEIGHT: 65 IN | WEIGHT: 140 LBS

## 2018-04-11 DIAGNOSIS — Z01.419 ENCOUNTER FOR GYNECOLOGICAL EXAMINATION: Primary | ICD-10-CM

## 2018-04-11 DIAGNOSIS — Z30.09 GENERAL COUNSELING AND ADVICE FOR CONTRACEPTIVE MANAGEMENT: ICD-10-CM

## 2018-04-11 PROCEDURE — 99395 PREV VISIT EST AGE 18-39: CPT | Performed by: OBSTETRICS & GYNECOLOGY

## 2018-04-11 NOTE — PROGRESS NOTES
Subjective     Chief Complaint   Patient presents with   • Gynecologic Exam     Annual:last mammo 2016         History of Present Illness      Anupama Arnold is a very pleasant  37 y.o. female who presents for annual exam.  Mammo Exam Age 39, Contraception condoms, Exercise occasionally  Patient is approximately 12 months postpartum from a premature delivery. Her infant daughter is doing surprisingly well gaining weight and meeting most milestones already. Patient states she has a little bit of ovulatory discomfort midline around her old  scar. But it is described is transient.  Gynecologically she doesn't have any other complaints we spent a little bit of time talking about different contraceptive options.. Her  is planning on a vasectomy, they will use condoms in the meantime. If she would like to proceed with something different she will call us back.      Obstetric History:  OB History      Para Term  AB Living    4 4 2 2 0 4    SAB TAB Ectopic Molar Multiple Live Births    0 0 0  0 4         Menstrual History:     Patient's last menstrual period was 2018 (approximate).       Sexual History:       Past Medical History:   Diagnosis Date   • Clotting disorder     MTHFR   • Ovarian cyst    • Pelvic pain      Past Surgical History:   Procedure Laterality Date   •  SECTION Bilateral 2017    Procedure:  SECTION PRIMARY;  Surgeon: Dea Kenyon MD;  Location: [facility] LABOR DELIVERY;  Service:        SOCIAL Hx:      The following portions of the patient's history were reviewed and updated as appropriate: allergies, current medications, past family history, past medical history, past social history, past surgical history and problem list.    Review of Systems        Except as outlined in history of physical illness, patient denies any changes in her GYN, , GI systems.  All other systems reviewed were negative.         Objective   Physical Exam    /68    "Ht 165.1 cm (65\")   Wt 63.5 kg (140 lb)   LMP 03/27/2018 (Approximate)   BMI 23.30 kg/m²     General: Patient is alert and oriented and appears overall healthy  Neck: Is supple without thyromegaly, no carotid bruits and no lymphadenopathy  Lungs: Clear bilaterally, no wheezing, rhonchi, or rales.  Respiratory rate is normal  Breast: Even, symmetrical, no lymphadenopathy, no retraction, no masses or cysts  Heart: Regular rate and rhythm are appreciated, no murmurs or rubs are heard  Abdomen: Is soft, without organomegaly, bowel sounds are positive, there is no                                rebound or guarding and palpation does not produce any discomfort. Previous scar is intact there is no evidence of hernia  Back: Nontender without CVA tenderness  Pelvic: External genitalia appear normal and consistent with mature female.  BUS normal                            Vagina is clean dry without discharge and appears adequately estrogenized, no               lesions or masses are present                         Cervix is noninflamed without discharge or lesions.  There is no cervical motion             tenderness.                Uterus is nonenlarged, without tenderness, and no masses or abnormalities are  present               Adnexa are non-enlarged, non tender               Rectal exam reveals adequate sphincter tone and no masses or lesions are                     appreciated on digital rectal examination.    Patient Active Problem List   Diagnosis   (none) - all problems resolved or deleted                 Assessment/Plan   Anupama was seen today for gynecologic exam.    Diagnoses and all orders for this visit:    Encounter for gynecological examination  -     IGP, Apt HPV,rfx 16 / 18,45 - ThinPrep Vial, Cervix    General counseling and advice for contraceptive management    As outlined above contraceptive options reviewed in detail    Annual Well Woman Exam    Discussed today's findings and concerns with patient in " detail.  Continue to recommend regular exercise to include cardiovascular and resistance training and breast self-exam. Wellness lab, mammography, and pap smear, in accordance with age guidelines.  Nutritional  recommendations  were discussed.    All of the patient's questions were addressed and answered, I have encouraged her to call for today's test results if she has not received them within 10 days.  Patient is advised to call with any change in her condition or with any other questions, otherwise return in 12 months for annual examination.

## 2018-04-13 ENCOUNTER — TELEPHONE (OUTPATIENT)
Dept: OBSTETRICS AND GYNECOLOGY | Age: 38
End: 2018-04-13

## 2018-04-13 NOTE — TELEPHONE ENCOUNTER
Dr JACKSON pt (ok to wait til Monday) went to sub ER for colitis, had CT done that showed a cyst on her ovary that was pretty large, pt stated she was ovulating at that time, sub dr wanted pt to contact Dr JACKSON. Pt states if Dr JACKSON could see CT and desires to see pt to let her know otherwise no need to call.

## 2018-05-22 PROBLEM — K52.9 COLITIS: Status: ACTIVE | Noted: 2018-05-22

## 2018-05-23 ENCOUNTER — PROCEDURE VISIT (OUTPATIENT)
Dept: OBSTETRICS AND GYNECOLOGY | Age: 38
End: 2018-05-23

## 2018-05-23 ENCOUNTER — OFFICE VISIT (OUTPATIENT)
Dept: OBSTETRICS AND GYNECOLOGY | Age: 38
End: 2018-05-23

## 2018-05-23 VITALS
DIASTOLIC BLOOD PRESSURE: 60 MMHG | SYSTOLIC BLOOD PRESSURE: 108 MMHG | WEIGHT: 137 LBS | HEIGHT: 64 IN | BODY MASS INDEX: 23.39 KG/M2

## 2018-05-23 DIAGNOSIS — N83.201 CYST OF RIGHT OVARY: Primary | ICD-10-CM

## 2018-05-23 DIAGNOSIS — N83.209 CYST OF OVARY, UNSPECIFIED LATERALITY: Primary | ICD-10-CM

## 2018-05-23 DIAGNOSIS — K52.9 GASTROENTERITIS: ICD-10-CM

## 2018-05-23 PROCEDURE — 76830 TRANSVAGINAL US NON-OB: CPT | Performed by: OBSTETRICS & GYNECOLOGY

## 2018-05-23 PROCEDURE — 99212 OFFICE O/P EST SF 10 MIN: CPT | Performed by: OBSTETRICS & GYNECOLOGY

## 2018-05-23 NOTE — PROGRESS NOTES
Assessment  1  Gastro-esophageal reflux (530 81) (K21 9)   2  Anxiety (300 00) (F41 9)    Plan  Anxiety    · Follow-up visit in 1 month Evaluation and Treatment  Follow-up  Status: Hold For - Scheduling   Requested for: [id number]  Family history of high cholesterol, Fatigue    · (1) LIPID PANEL, FASTING; Status:Active; Requested ERO:[id number];   Fatigue    · (1) CBC/PLT/DIFF; Status:Active; Requested NLN:[id number];    · (1) COMPREHENSIVE METABOLIC PANEL; Status:Active; Requested for:23Oct2017;    · (1) TSH WITH FT4 REFLEX; Status:Active; Requested GBK:[id number];    · (1) URINALYSIS (will reflex a microscopy if leukocytes, occult blood, protein or nitrites are not within  normal limits); Status:Active; Requested RTH:[id number];   Gastro-esophageal reflux    · RaNITidine HCl - 150 MG Oral Tablet; Take 1 tablet twice daily  Need for influenza vaccination    · Fluzone Quadrivalent Intramuscular Suspension  Need for Tdap vaccination    · Adacel 5-2-15 5 LF-MCG/0 5 Intramuscular Suspension    Discussion/Summary  Discussion Summary:   1  Gastroesophageal reflux disease  At this point she has had a full workup by in ears nose and throat physician with no findings as to chronic sinusitis or other abnormalities  The only other consideration would be possible gastroesophageal reflux disease  Patient was placed on Zantac 150 mg p o  b i d  and she will be returning to the office in 1 month and discuss whether or not this is improved her symptoms  Anxiety disorder  Patient states she is doing extremely well with present treatment which consists of only taking the lorazepam on rare occasions if any problems with increased anxiety  Patient was told to call us if she is having any new problems or concerns  patient will be getting the influenza vaccine today and also will be brought up-to-date with her Tdap     Counseling Documentation With Imm: The patient was counseled regarding diagnostic results,-- instructions for management,-- Subjective     Chief Complaint   Patient presents with   • Gynecologic Exam     Gyn u/s:follow up rt.ovarian cyst       History of Present Illness  Anupama Arnold is a 37 y.o. female is being seen today for Follow-up of a incidentally found right ovarian cyst. Patient had a flareup of viral gastroenteritis and during evaluation was found to have a right ovarian cyst. She is relatively asymptomatic from this however she does have a history of mittelschmerz for many years. She continues to breast-feed  Chief Complaint   Patient presents with   • Gynecologic Exam     Gyn u/s:follow up rt.ovarian cyst   .        The following portions of the patient's history were reviewed and updated as appropriate: allergies, current medications, past family history, past medical history, past social history, past surgical history and problem list.    PAST MEDICAL HISTORY  Past Medical History:   Diagnosis Date   • Clotting disorder     MTHFR   • Ovarian cyst    • Pelvic pain      OB History      Para Term  AB Living    4 4 2 2 0 4    SAB TAB Ectopic Molar Multiple Live Births    0 0 0   0 4        Past Surgical History:   Procedure Laterality Date   •  SECTION Bilateral 2017    Procedure:  SECTION PRIMARY;  Surgeon: Dea Kenyon MD;  Location: [facility] LABOR DELIVERY;  Service:      Family History   Problem Relation Age of Onset   • Hypertension Paternal Grandfather      History   Smoking Status   • Never Smoker   Smokeless Tobacco   • Never Used       Current Outpatient Prescriptions:   •  fexofenadine-pseudoephedrine (ALLEGRA-D 24) 180-240 MG per 24 hr tablet, Take 1 tablet by mouth., Disp: , Rfl:   •  fexofenadine-pseudoephedrine (ALLEGRA-D 24) 180-240 MG per 24 hr tablet, Take 1 tablet by mouth., Disp: , Rfl:   •  [employer] ALLERGY RELIEF D  MG per 12 hr tablet, TAKE 1 TABLET BY MOUTH EVERY 12 HOURS, Disp: , Rfl: 11  •  ibuprofen (ADVIL,MOTRIN) 600 MG tablet, Take 1 tablet by mouth Every 6  (Six) Hours As Needed for mild pain (1-3)., Disp: 30 tablet, Rfl: 0  Immunization History   Administered Date(s) Administered   • Flu Vaccine Quad PF >18YRS 10/12/2016   • Tdap 01/04/2017       Review of Systems       Except as outlined in history of physical illness, patient denies any changes in her GYN, , GI systems. All other systems reviewed are negative.    Objective   Physical Exam   Alert and oriented, respirations unlabored, heart regular rate and rhythm   Pelvic per ultrasound.      Assessment/Plan   Anupama was seen today for gynecologic exam.    Diagnoses and all orders for this visit:    Cyst of right ovary  4.6 x 4.5 appears simple fluid-filled. Options of suppression with OCPs were reviewed versus laparoscopic evaluation and treatment versus observation. Patient would like to simply observe and repeat an ultrasound in 3 months time she will call us if symptoms recur anything changes or she changes her mind. Limitations and risk of this management protocol were reviewed  Gastroenteritis  Resolved               EMR Dragon/ Transcription disclaimer:  Much of the encounter note is an electronic transcription/translation of spoken language to printed text. The electronic translation of spoken language may permit erroneous, or at times, nonessential words or phrases to be inadvertently transcribes; Although i have reviewed the note for such errors, some may still exist.   risk factor reductions,-- prognosis,-- patient and family education,-- impressions,-- risks and benefits of treatment options,-- importance of compliance with treatment  Immunization Counseling The parent/guardian was counseled on the following vaccine components: Influenza, Tdap  -- Total number of vaccine components counseled: Four components  Medication SE Review and Pt Understands Tx: Possible side effects of new medications were reviewed with the patient/guardian today  The treatment plan was reviewed with the patient/guardian  The patient/guardian understands and agrees with the treatment plan      Chief Complaint  Chief Complaint Free Text Note Form: Patient is here today for a follow up   [address] [place] [address] Ave: Patient is here today for follow up of chronic conditions described in HPI  History of Present Illness  HPI: Patient is a 42-year-old female with a history of no major medical problems  Patient is here today stating that she has been having some difficulty with a foul taste in her mouth  She states this happens throughout the day and does not seem to be related to a sinus infection or chronic sinusitis  Patient did have an ENT evaluation and they did scope the patient finding no abnormalities  She is wondering whether or not this may be secondary to some some reflux symptoms  Otherwise she is feeling well  She states she still has problems occasionally with anxiety  She states the only time she takes her lorazepam is when her kids are sick  She is interested in having full lab test performed and undergoing a complete physical       Review of Systems  Complete-Female:   Constitutional: No fever, no chills, feels well, no tiredness, no recent weight gain or weight loss  Eyes: No complaints of eye pain, no red eyes, no eyesight problems, no discharge, no dry eyes, no itching of eyes     ENT: no complaints of earache, no loss of hearing, no nose bleeds, no nasal discharge, no sore throat, no hoarseness  Cardiovascular: No complaints of slow heart rate, no fast heart rate, no chest pain, no palpitations, no leg claudication, no lower extremity edema  Respiratory: No complaints of shortness of breath, no wheezing, no cough, no SOB on exertion, no orthopnea, no PND  Gastrointestinal: No complaints of abdominal pain, no constipation, no nausea or vomiting, no diarrhea, no bloody stools  Genitourinary: No complaints of dysuria, no incontinence, no pelvic pain, no dysmenorrhea, no vaginal discharge or bleeding  Musculoskeletal: No complaints of arthralgias, no myalgias, no joint swelling or stiffness, no limb pain or swelling  Integumentary: No complaints of skin rash or lesions, no itching, no skin wounds, no breast pain or lump  Neurological: No complaints of headache, no confusion, no convulsions, no numbness, no dizziness or fainting, no tingling, no limb weakness, no difficulty walking  Psychiatric: Not suicidal, no sleep disturbance, no anxiety or depression, no change in personality, no emotional problems  Endocrine: No complaints of proptosis, no hot flashes, no muscle weakness, no deepening of the voice, no feelings of weakness  Hematologic/Lymphatic: No complaints of swollen glands, no swollen glands in the neck, does not bleed easily, does not bruise easily  Other Symptoms: Foul taste in the mouth  ROS Reviewed:   ROS reviewed  Active Problems  1  Acute conjunctivitis (372 00) (H10 30)   2  Acute sinusitis (461 9) (J01 90)   3  Anxiety (300 00) (F41 9)   4  Bladder dysfunction (596 59) (N31 9)   5  Counseling for birth control, oral contraceptives (V25 01) (Z30 09)   6  Encounter for routine gynecological examination (V72 31) (Z01 419)   7  Encounter for routine gynecological examination (V72 31) (Z01 419)   8  Exposure to influenza (V01 79) (Z20 828)   9  Fatigue (780 79) (R53 83)   10  Oral contraceptive prescribed (V25 01) (Z30 011)   11   Oral contraceptive use (V25 41) (Z30 41)   12  Pap smear, as part of routine gynecological examination (V76 2) (Z01 419)   13  URI (upper respiratory infection) (465 9) (J06 9)   14  Urinary tract infection (599 0) (N39 0)    Past Medical History  1  History of Colposcopy   2  History of Encounter for routine gynecological examination (V72 31) (Z01 419)   3  History of  2 (V22 2) (Z33 1)   4  History of cryosurgery (V45 89) (Z98 890)   5  History of LGSIL (low grade squamous intraepithelial dysplasia) (796 9)   6  History of Pap smear, as part of routine gynecological examination (V76 2) (Z01 419)    Surgical History  1  History of  Section   2  History of Oral Surgery Tooth Extraction   3  History of Tonsillectomy   4  History of Tubal Ligation    Family History  Mother    1  Family history of osteopenia (V17 89) (Z82 69)  Father    2  Family history of hypertension (V17 49) (Z82 49)   3  Family history of High cholesterol  Maternal Grandmother    4  Family history of cerebrovascular accident (V17 1) (Z82 3)  Maternal Great Grandmother    5  Family history of cerebrovascular accident (V17 1) (Z82 3)    Social History   · Denied: History of Home environment domestic violence   · Never A Smoker   · Oral contraceptive use (V25 41) (Z30 41)   · Social alcohol use (Z78 9)    Current Meds   1  Junel FE 1/20 1-20 MG-MCG Oral Tablet; Take 1 tablet daily; Therapy: [id number] to (Last Nkechi Friedman)  Requested for: [id number] Ordered   2  LORazepam 0 5 MG Oral Tablet; Take 1 tablet 3 times daily as needed; Therapy: 70REU4168 to (Last Rx:[id number]) Ordered    Allergies  1  No Known Drug Allergies    Vitals  Vital Signs    Recorded: 71NRQ0676 03:53PM   Temperature 98 8 F   Heart Rate 82   Systolic 183   Diastolic 76   Height 5 ft 1 in   Weight 101 lb 8 oz   BMI Calculated 19 18   BSA Calculated 1 42   O2 Saturation 98     Physical Exam    Constitutional Extremely-year-old female who is awake alert     Eyes Conjunctiva and lids: No swelling, erythema or discharge  Pupils and irises: Equal, round and reactive to light  Ears, Nose, Mouth, and Throat   External inspection of ears and nose: Normal     Otoscopic examination: Tympanic membranes translucent with normal light reflex  Canals patent without erythema  Nasal mucosa, septum, and turbinates: Normal without edema or erythema  Oropharynx: Normal with no erythema, edema, exudate or lesions  Pulmonary   Respiratory effort: No increased work of breathing or signs of respiratory distress  Auscultation of lungs: Clear to auscultation  Cardiovascular   Palpation of heart: Normal PMI, no thrills  Auscultation of heart: Normal rate and rhythm, normal S1 and S2, without murmurs  Abdomen   Abdomen: Non-tender, no masses  Liver and spleen: No hepatomegaly or splenomegaly  Lymphatic   Palpation of lymph nodes in neck: Abnormal     Musculoskeletal   Gait and station: Normal     Digits and nails: Normal without clubbing or cyanosis  Inspection/palpation of joints, bones, and muscles: Normal     Neurologic   Cranial nerves: Cranial nerves 2-12 intact  Reflexes: 2+ and symmetric  Sensation: No sensory loss      Psychiatric   Orientation to person, place, and time: Normal     Mood and affect: Normal          Future Appointments    Date/Time Provider Specialty Site   12/04/2017 03:45 PM Michel Sommers DO Internal Medicine [address] [place] [address] INTERNAL MED     Signatures   Electronically signed by : Whitney Bhatia DO; Oct 23 2017  5:29PM EST                       (Author)

## 2018-09-10 NOTE — TELEPHONE ENCOUNTER
Please let patient know that I'm sorry she is struggling with colitis. I would simply like to repeat an imaging study in our office in 4-6 weeks' time. No hurry. Please schedule a GYN ultrasound at that stage   [facility] RN/PT ordered. [facility] liaison notified. Patient has been scheduled with a PCP [facility].

## 2018-10-03 ENCOUNTER — OFFICE VISIT (OUTPATIENT)
Dept: OBSTETRICS AND GYNECOLOGY | Age: 38
End: 2018-10-03

## 2018-10-03 ENCOUNTER — PROCEDURE VISIT (OUTPATIENT)
Dept: OBSTETRICS AND GYNECOLOGY | Age: 38
End: 2018-10-03

## 2018-10-03 VITALS
WEIGHT: 139 LBS | BODY MASS INDEX: 23.16 KG/M2 | HEIGHT: 65 IN | DIASTOLIC BLOOD PRESSURE: 68 MMHG | SYSTOLIC BLOOD PRESSURE: 114 MMHG

## 2018-10-03 DIAGNOSIS — N83.201 CYST OF RIGHT OVARY: Primary | ICD-10-CM

## 2018-10-03 DIAGNOSIS — N92.0 MENORRHAGIA WITH REGULAR CYCLE: Primary | ICD-10-CM

## 2018-10-03 DIAGNOSIS — N83.201 CYST OF RIGHT OVARY: ICD-10-CM

## 2018-10-03 PROCEDURE — 99213 OFFICE O/P EST LOW 20 MIN: CPT | Performed by: OBSTETRICS & GYNECOLOGY

## 2018-10-03 PROCEDURE — 76830 TRANSVAGINAL US NON-OB: CPT | Performed by: OBSTETRICS & GYNECOLOGY

## 2018-10-03 RX ORDER — ACETAMINOPHEN 500 MG
500 TABLET ORAL EVERY 6 HOURS PRN
COMMUNITY
End: 2018-10-03

## 2018-10-03 RX ORDER — ETONOGESTREL AND ETHINYL ESTRADIOL 11.7; 2.7 MG/1; MG/1
INSERT, EXTENDED RELEASE VAGINAL
Qty: 1 EACH | Refills: 11 | Status: SHIPPED | OUTPATIENT
Start: 2018-10-03 | End: 2020-07-21

## 2018-10-03 NOTE — PROGRESS NOTES
Subjective     Chief Complaint   Patient presents with   • Gynecologic Exam     gyn u/s:rt. ovarian cyst       History of Present Illness  Anupama Arnold is a 38 y.o. female is being seen today for reevaluation of a right ovarian cyst  Additionally patient continues to have menorrhagia. We have reviewed different treatment options risks benefits potential complications in the past  Chief Complaint   Patient presents with   • Gynecologic Exam     gyn u/s:rt. ovarian cyst   .        The following portions of the patient's history were reviewed and updated as appropriate: allergies, current medications, past family history, past medical history, past social history, past surgical history and problem list.    PAST MEDICAL HISTORY  Past Medical History:   Diagnosis Date   • Clotting disorder (CMS/HCC)     MTHFR   • Ovarian cyst    • Pelvic pain      OB History      Para Term  AB Living    4 4 2 2 0 4    SAB TAB Ectopic Molar Multiple Live Births    0 0 0   0 4        Past Surgical History:   Procedure Laterality Date   •  SECTION Bilateral 2017    Procedure:  SECTION PRIMARY;  Surgeon: Dea Kenyon MD;  Location: [facility] LABOR DELIVERY;  Service:      Family History   Problem Relation Age of Onset   • Hypertension Paternal Grandfather      History   Smoking Status   • Never Smoker   Smokeless Tobacco   • Never Used       Current Outpatient Prescriptions:   •  [employer] ALLERGY RELIEF D  MG per 12 hr tablet, TAKE 1 TABLET BY MOUTH EVERY 12 HOURS, Disp: , Rfl: 11  •  fexofenadine-pseudoephedrine (ALLEGRA-D 24) 180-240 MG per 24 hr tablet, Take 1 tablet by mouth., Disp: , Rfl:   •  etonogestrel-ethinyl estradiol (NUVARING) 0.12-0.015 MG/24HR vaginal ring, Insert every month as discussed, Disp: 1 each, Rfl: 11  Immunization History   Administered Date(s) Administered   • Flu Vaccine Quad PF >18YRS 10/12/2016   • Tdap 2017       Review of Systems       Except as outlined in history  of physical illness, patient denies any changes in her GYN, , GI systems. All other systems reviewed are negative.    Objective   Physical Exam   Alert and oriented, respirations unlabored, heart regular rate and rhythm   Pelvic as per ultrasound      Assessment/Plan   Anupama was seen today for gynecologic exam.    Diagnoses and all orders for this visit:    Menorrhagia with regular cycle  Patient does not want to proceed with an endometrial ablation or IUD, she would like to use the NuvaRing and see if that is a an effective treatment. Risks benefits potential complications reviewed  Cyst of right ovary  Comments:  Actually  small paraovarian cyst, actual ovarian measurements are within normal. The simple paraovarian cyst are 2 and 4 cm. Will simply observe at present.    Other orders  -     etonogestrel-ethinyl estradiol (NUVARING) 0.12-0.015 MG/24HR vaginal ring; Insert every month as discussed                 EMR Dragon/ Transcription disclaimer:  Much of the encounter note is an electronic transcription/translation of spoken language to printed text. The electronic translation of spoken language may permit erroneous, or at times, nonessential words or phrases to be inadvertently transcribes; Although i have reviewed the note for such errors, some may still exist.

## 2019-04-17 ENCOUNTER — OFFICE VISIT (OUTPATIENT)
Dept: OBSTETRICS AND GYNECOLOGY | Age: 39
End: 2019-04-17

## 2019-04-17 VITALS
WEIGHT: 138 LBS | BODY MASS INDEX: 22.99 KG/M2 | SYSTOLIC BLOOD PRESSURE: 114 MMHG | DIASTOLIC BLOOD PRESSURE: 64 MMHG | HEIGHT: 65 IN

## 2019-04-17 DIAGNOSIS — Z01.419 ENCOUNTER FOR GYNECOLOGICAL EXAMINATION: Primary | ICD-10-CM

## 2019-04-17 DIAGNOSIS — N83.201 CYST OF RIGHT OVARY: ICD-10-CM

## 2019-04-17 PROBLEM — N92.0 MENORRHAGIA WITH REGULAR CYCLE: Status: RESOLVED | Noted: 2018-10-03 | Resolved: 2019-04-17

## 2019-04-17 PROCEDURE — 99395 PREV VISIT EST AGE 18-39: CPT | Performed by: OBSTETRICS & GYNECOLOGY

## 2019-04-17 NOTE — PROGRESS NOTES
"Subjective     Chief Complaint   Patient presents with   • Gynecologic Exam     Annual:last pap 2018         History of Present Illness      Anupama Arnold is a very pleasant  38 y.o. female who presents for annual exam.  Mammo Exam at age 39, Contraception choosing not to use any at this stage.  Patient has discontinued the NuvaRing and does not want any other prescriptions, Exercise times a week doing a lot of cycling at home  Patient is continuing to try to convince her  to get a vasectomy.  We discussed risk of pregnancy at this stage overall she is healthy and has no gynecological concerns or complaints.  The prior pelvic pain that she intermittently had is resolved seems to have vanished as she started to exercise.      Obstetric History:  OB History      Para Term  AB Living    4 4 2 2 0 4    SAB TAB Ectopic Molar Multiple Live Births    0 0 0   0 4         Menstrual History:     Patient's last menstrual period was 2019 (exact date).       Sexual History:       Past Medical History:   Diagnosis Date   • Clotting disorder (CMS/HCC)     MTHFR   • Ovarian cyst    • Pelvic pain      Past Surgical History:   Procedure Laterality Date   •  SECTION Bilateral 2017    Procedure:  SECTION PRIMARY;  Surgeon: Dea Kenyon MD;  Location: [facility] LABOR DELIVERY;  Service:        SOCIAL Hx:      The following portions of the patient's history were reviewed and updated as appropriate: allergies, current medications, past family history, past medical history, past social history, past surgical history and problem list.    Review of Systems        Except as outlined in history of physical illness, patient denies any changes in her GYN, , GI systems.  All other systems reviewed were negative.         Objective   Physical Exam    /64   Ht 165.1 cm (65\")   Wt 62.6 kg (138 lb)   LMP 2019 (Exact Date)   Breastfeeding? No   BMI 22.96 kg/m²     General: Patient " is alert and oriented and appears overall healthy  Neck: Is supple without thyromegaly, no carotid bruits and no lymphadenopathy  Lungs: Clear bilaterally, no wheezing, rhonchi, or rales.  Respiratory rate is normal  Breast: Even, symmetrical, no lymphadenopathy, no retraction, no masses or cysts  Heart: Regular rate and rhythm are appreciated, no murmurs or rubs are heard  Abdomen: Is soft, without organomegaly, bowel sounds are positive, there is no                                rebound or guarding and palpation does not produce any discomfort  Back: Nontender without CVA tenderness  Pelvic: External genitalia appear normal and consistent with mature female.  BUS normal                            Vagina is clean dry without discharge and appears adequately estrogenized, no               lesions or masses are present                         Cervix is noninflamed without discharge or lesions.  There is no cervical motion             tenderness.                Uterus is nonenlarged, without tenderness, and no masses or abnormalities are  present               Adnexa are non-enlarged, non tender               Rectal exam reveals adequate sphincter tone and no masses or lesions are                     appreciated on digital rectal examination.      Annual Well Woman Exam  Patient Active Problem List   Diagnosis   • Colitis   • Gastroenteritis   • Cyst of right ovary                 Assessment/Plan   Anupama was seen today for gynecologic exam.    Diagnoses and all orders for this visit:    Encounter for gynecological examination  -     IGP, Apt HPV,rfx 16 / 18,45    Cyst of right ovary  These were thought to be 2 small simple paraovarian cyst.  As outlined above pain is completely resolved.  Patient wants no further imaging at this stage.    Contraceptive options again reviewed    Continue encouragement on patient's exercise protocol          Discussed today's findings and concerns with patient in detail.  Continue to  recommend regular exercise to include cardiovascular and resistance training and breast self-exam. Wellness lab, mammography, and pap smear, in accordance with age guidelines.  Nutritional  recommendations  were discussed.    All of the patient's questions were addressed and answered, I have encouraged her to call for today's test results if she has not received them within 10 days.  Patient is advised to call with any change in her condition or with any other questions, otherwise return in 12 months for annual examination.          15

## 2020-02-13 ENCOUNTER — PROCEDURE VISIT (OUTPATIENT)
Dept: OBSTETRICS AND GYNECOLOGY | Age: 40
End: 2020-02-13

## 2020-02-13 ENCOUNTER — APPOINTMENT (OUTPATIENT)
Dept: WOMENS IMAGING | Facility: HOSPITAL | Age: 40
End: 2020-02-13

## 2020-02-13 ENCOUNTER — TELEPHONE (OUTPATIENT)
Dept: OBSTETRICS AND GYNECOLOGY | Age: 40
End: 2020-02-13

## 2020-02-13 DIAGNOSIS — Z12.31 VISIT FOR SCREENING MAMMOGRAM: Primary | ICD-10-CM

## 2020-02-13 PROCEDURE — 77067 SCR MAMMO BI INCL CAD: CPT | Performed by: OBSTETRICS & GYNECOLOGY

## 2020-02-13 PROCEDURE — 77067 SCR MAMMO BI INCL CAD: CPT | Performed by: RADIOLOGY

## 2020-02-13 NOTE — TELEPHONE ENCOUNTER
Patient has recently felt a hard knot in her right breast.  Stated her nipples are sore when she leans over.  No longer breast feeding (2 year old).  Unable to bring patient in for appointment until next Tuesday (2/18) with Alecia at 10:30 am.

## 2020-02-19 ENCOUNTER — TELEPHONE (OUTPATIENT)
Dept: OBSTETRICS AND GYNECOLOGY | Age: 40
End: 2020-02-19

## 2020-02-19 NOTE — TELEPHONE ENCOUNTER
----- Message from Jimmy Granados MD sent at 2/19/2020  8:56 AM EST -----  Please notify pt. That labs are with in normal limits

## 2020-07-21 ENCOUNTER — OFFICE VISIT (OUTPATIENT)
Dept: OBSTETRICS AND GYNECOLOGY | Age: 40
End: 2020-07-21

## 2020-07-21 VITALS
HEIGHT: 65 IN | WEIGHT: 139 LBS | SYSTOLIC BLOOD PRESSURE: 118 MMHG | DIASTOLIC BLOOD PRESSURE: 70 MMHG | BODY MASS INDEX: 23.16 KG/M2

## 2020-07-21 DIAGNOSIS — Z01.419 ENCOUNTER FOR GYNECOLOGICAL EXAMINATION: Primary | ICD-10-CM

## 2020-07-21 PROCEDURE — 99396 PREV VISIT EST AGE 40-64: CPT | Performed by: OBSTETRICS & GYNECOLOGY

## 2020-07-21 RX ORDER — ALBUTEROL SULFATE 90 UG/1
2 AEROSOL, METERED RESPIRATORY (INHALATION)
COMMUNITY
Start: 2019-10-02

## 2020-07-21 NOTE — PROGRESS NOTES
"Subjective     Chief Complaint   Patient presents with   • Gynecologic Exam     Annual:last pap ,mammo          History of Present Illness      Anupama Arnold is a very pleasant  40 y.o. female who presents for annual exam.  Mammo Exam ordered at [facility] because of insurance coverage, Contraception none patient states not sexually active, Exercise 5 times a week discussed the continued importance of that.  Patient denies any hospitalizations or surgeries.  She is overdue for wellness labs and I have printed out some orders for those, she has agreed to either come back in our office in a fasting state or to do those at a [facility] facility  Cycles are mostly normal.      Obstetric History:  OB History        4    Para   4    Term   2       2    AB   0    Living   4       SAB   0    TAB   0    Ectopic   0    Molar        Multiple   0    Live Births   4               Menstrual History:     Patient's last menstrual period was 2020 (approximate).       Sexual History:       Past Medical History:   Diagnosis Date   • Clotting disorder (CMS/HCC)     MTHFR   • Ovarian cyst    • Pelvic pain      Past Surgical History:   Procedure Laterality Date   •  SECTION Bilateral 2017    Procedure:  SECTION PRIMARY;  Surgeon: Dea Kenyon MD;  Location: [facility] LABOR DELIVERY;  Service:        SOCIAL Hx:      The following portions of the patient's history were reviewed and updated as appropriate: allergies, current medications, past family history, past medical history, past social history, past surgical history and problem list.    Review of Systems        Except as outlined in history of physical illness, patient denies any changes in her GYN, , GI systems.  All other systems reviewed were negative.         Objective   Physical Exam    /70   Ht 165.1 cm (65\")   Wt 63 kg (139 lb)   LMP 2020 (Approximate)   Breastfeeding No   BMI 23.13 kg/m²     General: " Patient is alert and oriented and appears overall healthy  Neck: Is supple without thyromegaly, no carotid bruits and no lymphadenopathy  Lungs: Clear bilaterally, no wheezing, rhonchi, or rales.  Respiratory rate is normal  Breast: Even, symmetrical, no lymphadenopathy, no retraction, no masses or cysts  Heart: Regular rate and rhythm are appreciated, no murmurs or rubs are heard  Abdomen: Is soft, without organomegaly, bowel sounds are positive, there is no                                rebound or guarding and palpation does not produce any discomfort  Back: Nontender without CVA tenderness  Pelvic: External genitalia appear normal and consistent with mature female.  BUS normal                            Vagina is clean dry without discharge and appears adequately estrogenized, no               lesions or masses are present                         Cervix is noninflamed without discharge or lesions.  There is no cervical motion             tenderness.                Uterus is nonenlarged, without tenderness, and no masses or abnormalities are  present               Adnexa are non-enlarged, non tender               Rectal exam reveals adequate sphincter tone and no masses or lesions are                     appreciated on digital rectal examination.      Annual Well Woman Exam  Patient Active Problem List   Diagnosis   • Colitis   • Gastroenteritis   • Cyst of right ovary                 Assessment/Plan   Anupama was seen today for gynecologic exam.    Diagnoses and all orders for this visit:    Encounter for gynecological examination  -     IGP, Apt HPV,rfx 16 / 18,45  -     CBC (No Diff)  -     Comprehensive Metabolic Panel  -     Thyroid Cascade Profile  -     Lipid Panel  -     Vitamin D 25 Hydroxy  -     Mammo Screening Digital Tomosynthesis Bilateral With CAD; Future    3D mammograms at [facility]    Discussed today's findings and concerns with patient.  Continue to recommend regular exercise including  cardiovascular and resistance training as well as  breast self-exam. Wellness lab, mammography, & pap smear, in accordance with age guidelines.    I have encouraged her to call for today's test results if she has not received them within 10 days.  Patient is advised to call with any change in her condition or with any other questions, otherwise return  for annual examination.

## 2020-07-24 LAB
CYTOLOGIST CVX/VAG CYTO: NORMAL
CYTOLOGY CVX/VAG DOC CYTO: NORMAL
CYTOLOGY CVX/VAG DOC THIN PREP: NORMAL
DX ICD CODE: NORMAL
HIV 1 & 2 AB SER-IMP: NORMAL
HPV I/H RISK 4 DNA CVX QL PROBE+SIG AMP: NEGATIVE
OTHER STN SPEC: NORMAL
STAT OF ADQ CVX/VAG CYTO-IMP: NORMAL

## 2020-08-04 LAB
25(OH)D3+25(OH)D2 SERPL-MCNC: 33.6 NG/ML (ref 30–100)
ALBUMIN SERPL-MCNC: 4.5 G/DL (ref 3.5–5.2)
ALBUMIN/GLOB SERPL: 2.5 G/DL
ALP SERPL-CCNC: 34 U/L (ref 39–117)
ALT SERPL-CCNC: 20 U/L (ref 1–33)
AST SERPL-CCNC: 16 U/L (ref 1–32)
BILIRUB SERPL-MCNC: 0.3 MG/DL (ref 0–1.2)
BUN SERPL-MCNC: 16 MG/DL (ref 6–20)
BUN/CREAT SERPL: 20.8 (ref 7–25)
CALCIUM SERPL-MCNC: 9.1 MG/DL (ref 8.6–10.5)
CHLORIDE SERPL-SCNC: 103 MMOL/L (ref 98–107)
CHOLEST SERPL-MCNC: 163 MG/DL (ref 0–200)
CO2 SERPL-SCNC: 25.9 MMOL/L (ref 22–29)
CREAT SERPL-MCNC: 0.77 MG/DL (ref 0.57–1)
ERYTHROCYTE [DISTWIDTH] IN BLOOD BY AUTOMATED COUNT: 12.6 % (ref 12.3–15.4)
GLOBULIN SER CALC-MCNC: 1.8 GM/DL
GLUCOSE SERPL-MCNC: 99 MG/DL (ref 65–99)
HCT VFR BLD AUTO: 36.5 % (ref 34–46.6)
HDLC SERPL-MCNC: 73 MG/DL (ref 40–60)
HGB BLD-MCNC: 12.2 G/DL (ref 12–15.9)
LDLC SERPL CALC-MCNC: 82 MG/DL (ref 0–100)
MCH RBC QN AUTO: 31.3 PG (ref 26.6–33)
MCHC RBC AUTO-ENTMCNC: 33.4 G/DL (ref 31.5–35.7)
MCV RBC AUTO: 93.6 FL (ref 79–97)
PLATELET # BLD AUTO: 273 10*3/MM3 (ref 140–450)
POTASSIUM SERPL-SCNC: 4.7 MMOL/L (ref 3.5–5.2)
PROT SERPL-MCNC: 6.3 G/DL (ref 6–8.5)
RBC # BLD AUTO: 3.9 10*6/MM3 (ref 3.77–5.28)
SODIUM SERPL-SCNC: 140 MMOL/L (ref 136–145)
TRIGL SERPL-MCNC: 39 MG/DL (ref 0–150)
TSH SERPL DL<=0.005 MIU/L-ACNC: 1.68 UIU/ML (ref 0.45–4.5)
VLDLC SERPL CALC-MCNC: 7.8 MG/DL
WBC # BLD AUTO: 5.73 10*3/MM3 (ref 3.4–10.8)

## 2020-08-10 ENCOUNTER — TELEPHONE (OUTPATIENT)
Dept: OBSTETRICS AND GYNECOLOGY | Age: 40
End: 2020-08-10

## 2021-03-01 ENCOUNTER — TELEPHONE (OUTPATIENT)
Dept: OBSTETRICS AND GYNECOLOGY | Age: 41
End: 2021-03-01

## 2021-03-01 NOTE — TELEPHONE ENCOUNTER
----- Message from Jimmy Granados MD sent at 3/1/2021 12:49 PM EST -----  Please notify pt. That labs are with in normal limits

## 2021-08-03 ENCOUNTER — OFFICE VISIT (OUTPATIENT)
Dept: OBSTETRICS AND GYNECOLOGY | Age: 41
End: 2021-08-03

## 2021-08-03 VITALS
WEIGHT: 140 LBS | DIASTOLIC BLOOD PRESSURE: 68 MMHG | HEIGHT: 65 IN | BODY MASS INDEX: 23.32 KG/M2 | SYSTOLIC BLOOD PRESSURE: 110 MMHG

## 2021-08-03 DIAGNOSIS — Z13.9 SPECIAL SCREENING: Primary | ICD-10-CM

## 2021-08-03 DIAGNOSIS — Z01.419 ENCOUNTER FOR GYNECOLOGICAL EXAMINATION: ICD-10-CM

## 2021-08-03 DIAGNOSIS — N39.0 FREQUENT URINARY TRACT INFECTIONS: ICD-10-CM

## 2021-08-03 DIAGNOSIS — N92.6 IRREGULAR MENSES: ICD-10-CM

## 2021-08-03 PROBLEM — N83.201 CYST OF RIGHT OVARY: Status: RESOLVED | Noted: 2018-10-03 | Resolved: 2021-08-03

## 2021-08-03 LAB
BILIRUB BLD-MCNC: NEGATIVE MG/DL
CLARITY, POC: CLEAR
COLOR UR: YELLOW
GLUCOSE UR STRIP-MCNC: NEGATIVE MG/DL
KETONES UR QL: NEGATIVE
LEUKOCYTE EST, POC: NEGATIVE
NITRITE UR-MCNC: NEGATIVE MG/ML
PH UR: 5.5 [PH] (ref 5–8)
PROT UR STRIP-MCNC: NEGATIVE MG/DL
RBC # UR STRIP: ABNORMAL /UL
SP GR UR: 1.03 (ref 1–1.03)
UROBILINOGEN UR QL: NORMAL

## 2021-08-03 PROCEDURE — 99396 PREV VISIT EST AGE 40-64: CPT | Performed by: OBSTETRICS & GYNECOLOGY

## 2021-08-03 PROCEDURE — 81002 URINALYSIS NONAUTO W/O SCOPE: CPT | Performed by: OBSTETRICS & GYNECOLOGY

## 2021-08-03 RX ORDER — AMOXICILLIN AND CLAVULANATE POTASSIUM 875; 125 MG/1; MG/1
TABLET, FILM COATED ORAL
COMMUNITY
Start: 2021-07-16

## 2021-08-03 NOTE — PROGRESS NOTES
Subjective     Chief Complaint   Patient presents with   • Gynecologic Exam     annual. last pap 20 (neg), last mg 20 (neg). Pt c/o of recurrent uti's after period.          History of Present Illness      Anupama Arnold is a very pleasant  41 y.o. female who presents for annual exam.  Mammo Exam 2021 consistent with dense breast but read as benign, Contraception not sexually active, Exercise runs 5 miles a day  Patient still having menstrual cycles they are about every 21 days but they only last for about a day or so but they are heavy when they come  This is the bleeding pattern patient had prior to children and she is okay with that wants no further evaluation or therapy    She also intermittently has mittelsmarkiemerz and as above does not want to pursue or treat that    She has had some frequent urinary tract infections and has a friend that is a urologist that has been managing those.  Her urinalysis today was negative for nitrites or bacteria she is on her menstrual period so there is some blood present    Her children are doing well.      Obstetric History:  OB History        4    Para   4    Term   2       2    AB   0    Living   4       SAB   0    TAB   0    Ectopic   0    Molar        Multiple   0    Live Births   4               Menstrual History:     Patient's last menstrual period was 2021 (exact date).       Sexual History:       Past Medical History:   Diagnosis Date   • Clotting disorder (CMS/HCC)     MTHFR   • Ovarian cyst    • Pelvic pain      Past Surgical History:   Procedure Laterality Date   •  SECTION Bilateral 2017    Procedure:  SECTION PRIMARY;  Surgeon: Dea Kenyon MD;  Location: [facility] LABOR DELIVERY;  Service:        SOCIAL Hx:      The following portions of the patient's history were reviewed and updated as appropriate: allergies, current medications, past family history, past medical history, past social history, past  "surgical history and problem list.    Review of Systems        Except as outlined in history of physical illness, patient denies any changes in her GYN, , GI systems.  All other systems reviewed were negative.         Current Outpatient Medications:   •  [employer] ALLERGY RELIEF D  MG per 12 hr tablet, TAKE 1 TABLET BY MOUTH EVERY 12 HOURS, Disp: , Rfl: 11  •  fexofenadine-pseudoephedrine (ALLEGRA-D 24) 180-240 MG per 24 hr tablet, Take 1 tablet by mouth., Disp: , Rfl:   •  albuterol sulfate  (90 Base) MCG/ACT inhaler, Inhale 2 puffs., Disp: , Rfl:   •  amoxicillin-clavulanate (AUGMENTIN) 875-125 MG per tablet, TAKE 1 TABLET BY MOUTH EVERY 12 HOURS FOR 14 DAYS, Disp: , Rfl:    Objective   Physical Exam    /68   Ht 165.1 cm (65\")   Wt 63.5 kg (140 lb)   LMP 08/01/2021 (Exact Date)   Breastfeeding No   BMI 23.30 kg/m²     General: Patient is alert and oriented and appears overall healthy  Neck: Is supple without thyromegaly, no carotid bruits and no lymphadenopathy  Lungs: Clear bilaterally, no wheezing, rhonchi, or rales.  Respiratory rate is normal  Breast: Even, symmetrical, no lymphadenopathy, no retraction, no masses or cysts  Heart: Regular rate and rhythm are appreciated, no murmurs or rubs are heard  Abdomen: Is soft, without organomegaly, bowel sounds are positive, there is no                                rebound or guarding and palpation does not produce any discomfort  Back: Nontender without CVA tenderness  Pelvic: External genitalia appear normal and consistent with mature female.  BUS normal                            Vagina is clean dry without discharge and appears adequately estrogenized, no               lesions or masses are present                         Cervix is noninflamed without discharge or lesions.  There is no cervical motion             tenderness.                Uterus is nonenlarged, without tenderness, and no masses or abnormalities are  present               " Adnexa are non-enlarged, non tender               Rectal exam reveals adequate sphincter tone and no masses or lesions are                     appreciated on digital rectal examination.      Annual Well Woman Exam  Patient Active Problem List   Diagnosis   • Colitis   • Gastroenteritis                 Assessment/Plan   Diagnoses and all orders for this visit:    1. Special screening (Primary)  -     Cancel: Urine Culture - Urine, Urine, Clean Catch  -     Urine Culture - Urine, Urine, Clean Catch    2. Encounter for gynecological examination  -     IGP, Apt HPV,rfx 16 / 18,45  -     POC Urinalysis Dipstick  -     CBC (No Diff); Future  -     Comprehensive Metabolic Panel; Future  -     Thyroid Cascade Profile; Future  -     Lipid Panel; Future  -     Vitamin D 25 Hydroxy; Future    3. Irregular menses  -     POC Urinalysis Dipstick    4. Frequent urinary tract infections  -     POC Urinalysis Dipstick    Patient declined urology evaluation, UA today was negative    Had some counseling in the office about family  and marital situation.  Patient declines any therapy states her  would have nothing to do with that.  She states she is okay with current situation.      Discussed today's findings and concerns with patient.  Continue to recommend regular exercise including cardiovascular and resistance training as well as  breast self-exam. Wellness lab, mammography, & pap smear, in accordance with age guidelines.    I have encouraged her to call for today's test results if she has not received them within 10 days.  Patient is advised to call with any change in her condition or with any other questions, otherwise return  for annual examination.

## 2021-08-05 ENCOUNTER — TELEPHONE (OUTPATIENT)
Dept: OBSTETRICS AND GYNECOLOGY | Age: 41
End: 2021-08-05

## 2021-08-05 LAB
BACTERIA UR CULT: NO GROWTH
BACTERIA UR CULT: NORMAL
CYTOLOGIST CVX/VAG CYTO: NORMAL
CYTOLOGY CVX/VAG DOC CYTO: NORMAL
CYTOLOGY CVX/VAG DOC THIN PREP: NORMAL
DX ICD CODE: NORMAL
HIV 1 & 2 AB SER-IMP: NORMAL
HPV I/H RISK 4 DNA CVX QL PROBE+SIG AMP: NEGATIVE
OTHER STN SPEC: NORMAL
STAT OF ADQ CVX/VAG CYTO-IMP: NORMAL

## 2021-08-05 NOTE — TELEPHONE ENCOUNTER
----- Message from Jimmy Granados MD sent at 8/5/2021 11:22 AM EDT -----   Please notify pt. That labs are with in normal limits

## 2022-01-21 ENCOUNTER — TELEPHONE (OUTPATIENT)
Dept: OBSTETRICS AND GYNECOLOGY | Age: 42
End: 2022-01-21

## 2022-01-21 DIAGNOSIS — Z12.31 ENCOUNTER FOR SCREENING MAMMOGRAM FOR MALIGNANT NEOPLASM OF BREAST: Primary | ICD-10-CM

## 2022-01-21 NOTE — TELEPHONE ENCOUNTER
Patient of Dr kendrick's called she is due to have her next mammogram in February which is gets done in [place].  Requesting an order be placed for this.  Please advise.

## 2022-08-09 ENCOUNTER — OFFICE VISIT (OUTPATIENT)
Dept: OBSTETRICS AND GYNECOLOGY | Age: 42
End: 2022-08-09

## 2022-08-09 VITALS
SYSTOLIC BLOOD PRESSURE: 120 MMHG | HEIGHT: 65 IN | DIASTOLIC BLOOD PRESSURE: 64 MMHG | WEIGHT: 141 LBS | BODY MASS INDEX: 23.49 KG/M2

## 2022-08-09 DIAGNOSIS — Z01.419 ENCOUNTER FOR GYNECOLOGICAL EXAMINATION: Primary | ICD-10-CM

## 2022-08-09 DIAGNOSIS — Z12.31 BREAST CANCER SCREENING BY MAMMOGRAM: ICD-10-CM

## 2022-08-09 DIAGNOSIS — N92.0 MENORRHAGIA WITH REGULAR CYCLE: ICD-10-CM

## 2022-08-09 DIAGNOSIS — Z87.440 HISTORY OF UTI: ICD-10-CM

## 2022-08-09 LAB
BILIRUB BLD-MCNC: NEGATIVE MG/DL
CLARITY, POC: CLEAR
COLOR UR: YELLOW
GLUCOSE UR STRIP-MCNC: NEGATIVE MG/DL
KETONES UR QL: NEGATIVE
LEUKOCYTE EST, POC: NEGATIVE
NITRITE UR-MCNC: NEGATIVE MG/ML
PH UR: 6 [PH] (ref 5–8)
PROT UR STRIP-MCNC: NEGATIVE MG/DL
RBC # UR STRIP: NEGATIVE /UL
SP GR UR: 1.03 (ref 1–1.03)
UROBILINOGEN UR QL: NORMAL

## 2022-08-09 PROCEDURE — 99396 PREV VISIT EST AGE 40-64: CPT | Performed by: OBSTETRICS & GYNECOLOGY

## 2022-08-09 PROCEDURE — 81002 URINALYSIS NONAUTO W/O SCOPE: CPT | Performed by: OBSTETRICS & GYNECOLOGY

## 2022-08-09 NOTE — PROGRESS NOTES
Subjective     Chief Complaint   Patient presents with   • Gynecologic Exam     Annual:Last pap ,mammo ,had uti 1 month ago,requesting u/a         History of Present Illness      Anupama Arnold is a very pleasant  42 y.o. female who presents for annual exam.  Mammo Exam 2022 at [facility] and read is negative, Contraception yes not currently sexually active, Exercise 7 times a week    Patient has regular menses but described as extremely heavy, describes being lightheaded at during the heavy amount of flow.  She does not tolerate birth control pills wants no hormones and is not interested in an IUD.  Is desirous of an endometrial ablation    She thinks she has urinary tract infections intermittently was given some antibiotics recently and today's urinalysis is completely negative.    She is overdue for wellness labs but is in a nonfasting state nonetheless would like to have some drawn.    Lots of issues with her children and her home life.  We discussed those in some detail.      Obstetric History:  OB History        4    Para   4    Term   2       2    AB   0    Living   4       SAB   0    IAB   0    Ectopic   0    Molar        Multiple   0    Live Births   4               Menstrual History:     Patient's last menstrual period was 2022 (exact date).       Sexual History:       Past Medical History:   Diagnosis Date   • Clotting disorder (HCC)     MTHFR   • Ovarian cyst    • Pelvic pain      Past Surgical History:   Procedure Laterality Date   •  SECTION Bilateral 2017    Procedure:  SECTION PRIMARY;  Surgeon: Dea Kenyon MD;  Location: [facility] LABOR DELIVERY;  Service:        SOCIAL Hx:      The following portions of the patient's history were reviewed and updated as appropriate: allergies, current medications, past family history, past medical history, past social history, past surgical history and problem list.    Review of Systems        Except  "as outlined in history of physical illness, patient denies any changes in her GYN, , GI systems.  All other systems reviewed were negative.         Current Outpatient Medications:   •  albuterol sulfate  (90 Base) MCG/ACT inhaler, Inhale 2 puffs., Disp: , Rfl:   •  [employer] ALLERGY RELIEF D  MG per 12 hr tablet, TAKE 1 TABLET BY MOUTH EVERY 12 HOURS, Disp: , Rfl: 11  •  fexofenadine-pseudoephedrine (ALLEGRA-D 24) 180-240 MG per 24 hr tablet, Take 1 tablet by mouth., Disp: , Rfl:   •  amoxicillin-clavulanate (AUGMENTIN) 875-125 MG per tablet, TAKE 1 TABLET BY MOUTH EVERY 12 HOURS FOR 14 DAYS, Disp: , Rfl:    Objective   Physical Exam    /64   Ht 165.1 cm (65\")   Wt 64 kg (141 lb)   LMP 07/21/2022 (Exact Date)   Breastfeeding No   BMI 23.46 kg/m²     General: Patient is alert and oriented and appears overall healthy  Neck: Is supple without thyromegaly, no carotid bruits and no lymphadenopathy  Lungs: Clear bilaterally, no wheezing, rhonchi, or rales.  Respiratory rate is normal  Breast: Even, symmetrical, no lymphadenopathy, no retraction, no masses or cysts  Heart: Regular rate and rhythm are appreciated, no murmurs or rubs are heard  Abdomen: Is soft, without organomegaly, bowel sounds are positive, there is no rebound or guarding and palpation does not produce any discomfort  Back: Nontender without CVA tenderness  Pelvic: External genitalia appear normal and consistent with mature female.  BUS normal                            Vagina is clean dry without discharge and appears adequately estrogenized, no lesions or masses are present                         Cervix is noninflamed without discharge or lesions.  There is no cervical motion tenderness.                Uterus is nonenlarged, without tenderness, and no masses or abnormalities are  present               Adnexa are non-enlarged, non tender               Rectal exam reveals adequate sphincter tone and no masses or lesions are " appreciated on digital rectal examination.      Annual Well Woman Exam  Patient Active Problem List   Diagnosis   • Colitis   • Gastroenteritis                 Assessment & Plan   Diagnoses and all orders for this visit:    1. Encounter for gynecological examination (Primary)  -     IGP, Apt HPV,rfx 16 / 18,45  -     POC Urinalysis Dipstick  -     CBC (No Diff)  -     TSH  -     Vitamin D 25 Hydroxy  -     Basic Metabolic Panel    2. History of UTI  -     IGP, Apt HPV,rfx 16 / 18,45  -     POC Urinalysis Dipstick    3. Menorrhagia with regular cycle  -     CBC (No Diff)  -     TSH  -     Vitamin D 25 Hydroxy  -     Basic Metabolic Panel  -     Ambulatory Referral to Gynecology  Have made a referral to a gynecologist who is within her network for possible evaluation of an endometrial ablation which patient is desirous of.  4. Breast cancer screening by mammogram  -     Mammo Screening Digital Tomosynthesis Bilateral With CAD; Future      Discussed today's findings and concerns with patient.  Continue to recommend regular exercise including cardiovascular and resistance training as well as  breast self-exam. Wellness lab, mammography, & pap smear, in accordance with age guidelines.    I have encouraged her to call for today's test results if she has not received them within 10 days.  Patient is advised to call with any change in her condition or with any other questions, otherwise return  for annual examination.

## 2022-08-10 LAB
25(OH)D3+25(OH)D2 SERPL-MCNC: 34.1 NG/ML (ref 30–100)
BUN SERPL-MCNC: 22 MG/DL (ref 6–24)
BUN/CREAT SERPL: 34 (ref 9–23)
CALCIUM SERPL-MCNC: 9.6 MG/DL (ref 8.7–10.2)
CHLORIDE SERPL-SCNC: 104 MMOL/L (ref 96–106)
CO2 SERPL-SCNC: 22 MMOL/L (ref 20–29)
CREAT SERPL-MCNC: 0.64 MG/DL (ref 0.57–1)
EGFRCR SERPLBLD CKD-EPI 2021: 112 ML/MIN/1.73
ERYTHROCYTE [DISTWIDTH] IN BLOOD BY AUTOMATED COUNT: 13.3 % (ref 11.7–15.4)
GLUCOSE SERPL-MCNC: 81 MG/DL (ref 65–99)
HCT VFR BLD AUTO: 35.6 % (ref 34–46.6)
HGB BLD-MCNC: 11.8 G/DL (ref 11.1–15.9)
MCH RBC QN AUTO: 31.6 PG (ref 26.6–33)
MCHC RBC AUTO-ENTMCNC: 33.1 G/DL (ref 31.5–35.7)
MCV RBC AUTO: 95 FL (ref 79–97)
PLATELET # BLD AUTO: 271 X10E3/UL (ref 150–450)
POTASSIUM SERPL-SCNC: 4.3 MMOL/L (ref 3.5–5.2)
RBC # BLD AUTO: 3.74 X10E6/UL (ref 3.77–5.28)
SODIUM SERPL-SCNC: 139 MMOL/L (ref 134–144)
TSH SERPL DL<=0.005 MIU/L-ACNC: 1.35 UIU/ML (ref 0.45–4.5)
WBC # BLD AUTO: 7.7 X10E3/UL (ref 3.4–10.8)

## 2022-08-12 LAB
CYTOLOGIST CVX/VAG CYTO: NORMAL
CYTOLOGY CVX/VAG DOC CYTO: NORMAL
CYTOLOGY CVX/VAG DOC THIN PREP: NORMAL
DX ICD CODE: NORMAL
HIV 1 & 2 AB SER-IMP: NORMAL
HPV I/H RISK 4 DNA CVX QL PROBE+SIG AMP: NEGATIVE
Lab: NORMAL
OTHER STN SPEC: NORMAL
STAT OF ADQ CVX/VAG CYTO-IMP: NORMAL

## 2023-02-10 DIAGNOSIS — Z12.31 BREAST CANCER SCREENING BY MAMMOGRAM: Primary | ICD-10-CM

## 2023-02-16 ENCOUNTER — TELEPHONE (OUTPATIENT)
Dept: OBSTETRICS AND GYNECOLOGY | Age: 43
End: 2023-02-16
Payer: COMMERCIAL

## 2023-02-16 DIAGNOSIS — R92.8 ABNORMAL MAMMOGRAM: Primary | ICD-10-CM

## 2023-02-16 NOTE — TELEPHONE ENCOUNTER
Dr Granados pt    Pt is calling she had her mammogram done and it is incomplete and they are recommending an ultrasound.  She doesn't want the ultrasound she prefers to get an MRI.    Secondly she is having an esophageal studies done next Wednesday and wondered if that barium would interfere with the MRI?

## 2023-02-16 NOTE — TELEPHONE ENCOUNTER
Spoke with pt she wants the MRI regardless.  Can we order that or does this need to wait for Dr Olsen?

## 2023-02-17 ENCOUNTER — TELEPHONE (OUTPATIENT)
Dept: OBSTETRICS AND GYNECOLOGY | Age: 43
End: 2023-02-17
Payer: COMMERCIAL

## 2023-02-17 NOTE — TELEPHONE ENCOUNTER
[facility] calling for orders for a Left Breast dx m/g and a left breast ultrasound.    Fax [phone]

## 2023-02-20 ENCOUNTER — TELEPHONE (OUTPATIENT)
Dept: OBSTETRICS AND GYNECOLOGY | Age: 43
End: 2023-02-20
Payer: COMMERCIAL

## 2023-02-20 DIAGNOSIS — R92.8 ABNORMAL MAMMOGRAM: Primary | ICD-10-CM

## 2023-02-20 PROBLEM — Z80.3 FAMILY HISTORY OF BREAST CANCER: Status: ACTIVE | Noted: 2023-02-20

## 2023-02-20 NOTE — TELEPHONE ENCOUNTER
Pt calling today to inform  of change in Family Medical history, Pt finding out her Maternal grandmother had Breast cancer and passed away at an early age due to this, Pt states her Mother goes to get MRI's instead of normal mammogram imaging due to history of Breast lumps. Pt wanting to speak with  due to being worried and concerned about recent imaging and now knowing her family history, pt is avaible to call at any time, Please advise

## 2024-01-19 ENCOUNTER — OFFICE VISIT (OUTPATIENT)
Dept: OBSTETRICS AND GYNECOLOGY | Age: 44
End: 2024-01-19
Payer: COMMERCIAL

## 2024-01-19 VITALS
BODY MASS INDEX: 24.83 KG/M2 | HEIGHT: 65 IN | WEIGHT: 149 LBS | SYSTOLIC BLOOD PRESSURE: 110 MMHG | DIASTOLIC BLOOD PRESSURE: 70 MMHG

## 2024-01-19 DIAGNOSIS — R92.8 ABNORMAL MAMMOGRAM: Primary | ICD-10-CM

## 2024-01-19 DIAGNOSIS — N92.0 MENORRHAGIA WITH REGULAR CYCLE: ICD-10-CM

## 2024-01-19 DIAGNOSIS — Z12.4 SCREENING FOR MALIGNANT NEOPLASM OF CERVIX: ICD-10-CM

## 2024-01-19 DIAGNOSIS — Z11.3 SCREEN FOR STD (SEXUALLY TRANSMITTED DISEASE): ICD-10-CM

## 2024-01-19 DIAGNOSIS — Z13.21 ENCOUNTER FOR VITAMIN DEFICIENCY SCREENING: ICD-10-CM

## 2024-01-19 DIAGNOSIS — Z11.51 SCREENING FOR HUMAN PAPILLOMAVIRUS (HPV): ICD-10-CM

## 2024-01-19 DIAGNOSIS — Z13.29 SCREENING FOR THYROID DISORDER: ICD-10-CM

## 2024-01-19 DIAGNOSIS — Z13.89 SCREENING FOR BLOOD OR PROTEIN IN URINE: ICD-10-CM

## 2024-01-19 DIAGNOSIS — Z13.0 SCREENING, ANEMIA, DEFICIENCY, IRON: ICD-10-CM

## 2024-01-19 DIAGNOSIS — Z01.419 WELL FEMALE EXAM WITH ROUTINE GYNECOLOGICAL EXAM: Primary | ICD-10-CM

## 2024-01-19 DIAGNOSIS — Z12.31 SCREENING MAMMOGRAM FOR BREAST CANCER: ICD-10-CM

## 2024-01-19 DIAGNOSIS — N39.3 SUI (STRESS URINARY INCONTINENCE, FEMALE): ICD-10-CM

## 2024-01-19 DIAGNOSIS — Z13.228 SCREENING FOR METABOLIC DISORDER: ICD-10-CM

## 2024-01-19 DIAGNOSIS — Z13.220 SCREENING FOR LIPID DISORDERS: ICD-10-CM

## 2024-01-19 DIAGNOSIS — Z13.1 SCREENING FOR DIABETES MELLITUS: ICD-10-CM

## 2024-01-19 LAB
BILIRUB BLD-MCNC: NEGATIVE MG/DL
GLUCOSE UR STRIP-MCNC: NEGATIVE MG/DL
KETONES UR QL: NEGATIVE
LEUKOCYTE EST, POC: NEGATIVE
NITRITE UR-MCNC: NEGATIVE MG/ML
PH UR: 6 [PH] (ref 5–8)
PROT UR STRIP-MCNC: NEGATIVE MG/DL
RBC # UR STRIP: NEGATIVE /UL
SP GR UR: 1.01 (ref 1–1.03)
UROBILINOGEN UR QL: NORMAL

## 2024-01-19 RX ORDER — SERTRALINE HYDROCHLORIDE 25 MG/1
50 TABLET, FILM COATED ORAL DAILY
COMMUNITY
Start: 2024-01-15

## 2024-01-19 NOTE — PROGRESS NOTES
Subjective     History of Present Illness    Chief Complaint   Patient presents with    Gynecologic Exam     Cc: Dr. Kendrick patient here for annual visit today , last pap 22 neg , last mammogram @ [facility] 22 - Left dx breast mammogram with [facility] 3/1/23 hx dense breast  , supposed to schedule a breast MRI   Contraception none   Periods are on the  heavier side dr kendrick recommended once ablation   C/o urine leakage mostly when running wears a panty liner        Anupama Arnold is a 43 y.o. female who presents for annual exam.  Patient of Dr. Kendrick   PAP - normal/negative in , has been getting PAPs yearly and will therefore have one today.  Mammo -  showed dense tissue and asymmetry in left breast tissue, f/u with MRI and left breast US was recommended. Left breast US was normal but pt did not complete MRI. She gets imaging done at [facility].   Urinary incontinence x 7 years - tried kegel exercises at home with no relief, pt is not interested in seeing urology but would like some treatment. She states leakage happens when running or sometimes big movement and wears a panty liner daily for this.   Menses - Heavy menses and had previously spoken with Dr. Kendrick about an ablation. Pt states she is done having kids. She is interested in referral to [facility], because they are in her network.  Wellness labs - pt requests having wellness labs done in our office. She would like to return when fasting.   Anxiety - Doing well on zoloft    Her menses are regular every 28-30 days, lasting 4-7 days.    Obstetric History:  OB History          4    Para   4    Term   2       2    AB   0    Living   4         SAB   0    IAB   0    Ectopic   0    Molar        Multiple   0    Live Births   4               Menstrual History:     Patient's last menstrual period was 2024 (exact date).           Current contraception: none  History of abnormal Pap smear: no  Received Gardasil immunization: no  Perform  "regular self breast exam: yes -      Mammogram: ordered.  Colonoscopy: not indicated.  DEXA: not indicated.      The following portions of the patient's history were reviewed and updated as appropriate: allergies, current medications, past family history, past medical history, past social history, past surgical history, and problem list.    Review of Systems  A comprehensive review of systems was negative except for:    GYN:  positive for  menorrhagia       Objective   Physical Exam    /70   Ht 165.1 cm (65\")   Wt 67.6 kg (149 lb)   LMP 01/01/2024 (Exact Date)   BMI 24.79 kg/m²      General: alert, appears stated age, cooperative, and no distress   Heart: regular rate and rhythm, S1, S2 normal, no murmur, click, rub or gallop   Lungs: clear to auscultation bilaterally   Abdomen: soft, non-tender, without masses or organomegaly   Breast: inspection negative, no nipple discharge or bleeding, no masses or nodularity palpable   External genitalia/Vulva: External genitalia including bartholin's glands, Urethra, Isleton's gland and urethra meatus are normal and Bladder appears normal without significant prolapse    Vagina: normal mucosa, normal discharge   Cervix: no lesions   Uterus: normal size and non-tender   Adnexa: normal adnexa and no mass, fullness, tenderness   Neurologic: Alert and Oriented x3   Psychiatric: Normal affect, judgement, and mood       Assessment   Diagnoses and all orders for this visit:    1. Well female exam with routine gynecological exam (Primary)  -     IGP, Apt HPV,rfx 16 / 18,45    2. ARMANDO (stress urinary incontinence, female)  -     Ambulatory Referral to Physical Therapy Pelvic Floor    3. Menorrhagia with regular cycle  -     Ambulatory Referral to Gynecology    4. Screening for human papillomavirus (HPV)  -     IGP, Apt HPV,rfx 16 / 18,45    5. Screening mammogram for breast cancer  -     Mammo Screening Digital Tomosynthesis Bilateral With CAD; Future    6. Screening for " malignant neoplasm of cervix  -     IGP, Apt HPV,rfx 16 / 18,45    7. Screen for STD (sexually transmitted disease)  -     NuSwab VG+ - Swab, Vagina    8. Screening for blood or protein in urine  -     POC Urinalysis Dipstick    9. Encounter for vitamin deficiency screening  -     Vitamin D,25-Hydroxy    10. Screening, anemia, deficiency, iron  -     CBC (No Diff)    11. Screening for metabolic disorder  -     Comprehensive Metabolic Panel    12. Screening for diabetes mellitus  -     Hemoglobin A1c    13. Screening for lipid disorders  -     Lipid Panel    14. Screening for thyroid disorder  -     TSH+Free T4            Plan   -PAP done today, will call patient with results   -Nuswab VG+ done today, will call patient with results and treat accordingly.  -UA negative for UTI  -Mammo ordered for [facility], per pt request for her insurance network.   -Wellness labs order placed, patient is planning on returning when she is fasting.  Will call patient with results.  -Stress Urinary Incontinence: Referral to pelvic floor physical therapy placed.   -Menorrhagia: Referral to gynecology for ablation consult with [facility] placed, per pt request for her insurance network.    As part of wellness and prevention, the following topics were discussed with the patient:  Encouraged self breast exam  Physical activity and regular exercised encouraged.   Nutrition discussed.  Mental health discussed.   Encouraged vitamin D 600IU supplement and 1200mg calcium supplement over the counter to prevent osteoporosis.    All questions answered.   Advised patient to contact me if any questions or concerns arise before their next annual appointment.  Return in 1 year (on 1/19/2025) for Annual exam.

## 2024-01-23 LAB
A VAGINAE DNA VAG QL NAA+PROBE: NORMAL SCORE
BVAB2 DNA VAG QL NAA+PROBE: NORMAL SCORE
C ALBICANS DNA VAG QL NAA+PROBE: NEGATIVE
C GLABRATA DNA VAG QL NAA+PROBE: NEGATIVE
C TRACH DNA VAG QL NAA+PROBE: NEGATIVE
MEGA1 DNA VAG QL NAA+PROBE: NORMAL SCORE
N GONORRHOEA DNA VAG QL NAA+PROBE: NEGATIVE
T VAGINALIS DNA VAG QL NAA+PROBE: NEGATIVE

## 2024-01-24 LAB
CYTOLOGIST CVX/VAG CYTO: NORMAL
CYTOLOGY CVX/VAG DOC CYTO: NORMAL
CYTOLOGY CVX/VAG DOC THIN PREP: NORMAL
DX ICD CODE: NORMAL
HIV 1 & 2 AB SER-IMP: NORMAL
HPV I/H RISK 4 DNA CVX QL PROBE+SIG AMP: NEGATIVE
OTHER STN SPEC: NORMAL
PATHOLOGIST CVX/VAG CYTO: NORMAL
STAT OF ADQ CVX/VAG CYTO-IMP: NORMAL

## 2024-01-29 ENCOUNTER — TELEPHONE (OUTPATIENT)
Dept: OBSTETRICS AND GYNECOLOGY | Age: 44
End: 2024-01-29
Payer: COMMERCIAL

## 2024-01-29 NOTE — TELEPHONE ENCOUNTER
PT- SAW MORENA BLANCAS FOR ANNUAL 01/19/2024    Caller: Anupama Arnold    Relationship to patient: Self    Best call back number: [phone]    Patient is needing: ORDER FOR MRI. PT STATES SHE CALLED [facility] TO SCHEDULE BREAST MRI AND WAS TOLD THERE WAS NOT A REFERRAL FOR THIS PROCEDURE- PLEASE ADVISE PT AT NUMBER LISTED ABOVE AT ANYTIME, FINE WITH LVM.

## 2024-01-30 ENCOUNTER — TELEPHONE (OUTPATIENT)
Dept: OBSTETRICS AND GYNECOLOGY | Age: 44
End: 2024-01-30
Payer: COMMERCIAL

## 2024-01-30 DIAGNOSIS — Z91.89 AT HIGH RISK FOR BREAST CANCER: Primary | ICD-10-CM

## 2024-01-31 NOTE — TELEPHONE ENCOUNTER
"I cancelled duplicate order (jerry already ordered MRI).  I faxed to [facility].      If pt says they did not get order, she may need to tell them it's in the \"[facility]\".  They sort orders by facility/location.  "

## 2024-04-10 ENCOUNTER — TELEPHONE (OUTPATIENT)
Dept: OBSTETRICS AND GYNECOLOGY | Age: 44
End: 2024-04-10
Payer: COMMERCIAL

## 2024-04-10 NOTE — TELEPHONE ENCOUNTER
----- Message from Jimmy Granados MD sent at 4/9/2024  4:17 PM EDT -----  Mayra please make sure she knows that her MRI was negative.  I think because of her insurance she is seeing a [facility] physician but I just want to make sure this did not fall through the cracks

## 2024-04-24 ENCOUNTER — TELEPHONE (OUTPATIENT)
Dept: OBSTETRICS AND GYNECOLOGY | Age: 44
End: 2024-04-24
Payer: COMMERCIAL

## 2024-04-24 NOTE — TELEPHONE ENCOUNTER
----- Message from Jimmy Granados MD sent at 4/9/2024  4:17 PM EDT -----  Mayra please make sure she knows that her MRI was negative.  I think because of her insurance she is seeing a [facility] physician but I just want to make sure this did not fall through the cracks

## 2024-09-06 ENCOUNTER — TELEPHONE (OUTPATIENT)
Dept: OBSTETRICS AND GYNECOLOGY | Age: 44
End: 2024-09-06

## 2024-09-06 NOTE — TELEPHONE ENCOUNTER
Caller: Anupama Arnold    Relationship: Self    Best call back number: [phone]     What is the best time to reach you: CALL ANYTIME, IT IS OKAY TO LVM.    Who are you requesting to speak with (clinical staff, provider,  specific staff member): FIRST AVAILABLE      PATIENT IS REQUESTING TO CHECK WITH CLINICAL TO DISCUSS ABNORMAL MENSTRUAL BLEEDING. STARTED A NORMAL PERIOD ON 08/24/24. AFTER NORMAL FLOW HAS BEEN LIGHT SPOTTING SINCE. IS NOW GOING ON 14 DAYS ON BLEEDING OR SPOTTING. PATIENT DECLINED BEING PREGNANT OR HAVING ABNORMAL CRAMPING.  LAST SAW MORENA LEVY 01/19/24 FOR ANNUAL.

## 2024-09-06 NOTE — TELEPHONE ENCOUNTER
Spoke with pt. Pt stating she usually has heavy painful periods but that they are always regular. Pt started her menses this past month on 8/24/24 & has had spotting when wiping or being active ever since. Pt is unsure if this could be related to possibly being perimenopausal. Pt is not currently on birth control & would like to know if starting on a pill would help? Pt would like to know what you recommend. Please advise.

## 2024-09-30 ENCOUNTER — OFFICE VISIT (OUTPATIENT)
Dept: OBSTETRICS AND GYNECOLOGY | Age: 44
End: 2024-09-30
Payer: COMMERCIAL

## 2024-09-30 VITALS
DIASTOLIC BLOOD PRESSURE: 72 MMHG | SYSTOLIC BLOOD PRESSURE: 112 MMHG | HEIGHT: 65 IN | BODY MASS INDEX: 25.62 KG/M2 | WEIGHT: 153.8 LBS

## 2024-09-30 DIAGNOSIS — N92.0 MENORRHAGIA WITH REGULAR CYCLE: ICD-10-CM

## 2024-09-30 DIAGNOSIS — N92.6 IRREGULAR BLEEDING: Primary | ICD-10-CM

## 2024-09-30 DIAGNOSIS — Z30.011 ENCOUNTER FOR INITIAL PRESCRIPTION OF CONTRACEPTIVE PILLS: ICD-10-CM

## 2024-09-30 LAB
B-HCG UR QL: NEGATIVE
EXPIRATION DATE: NORMAL
INTERNAL NEGATIVE CONTROL: NEGATIVE
INTERNAL POSITIVE CONTROL: POSITIVE
Lab: NORMAL

## 2024-09-30 PROCEDURE — 81025 URINE PREGNANCY TEST: CPT

## 2024-09-30 PROCEDURE — 99214 OFFICE O/P EST MOD 30 MIN: CPT

## 2024-09-30 RX ORDER — FEXOFENADINE HCL AND PSEUDOEPHEDRINE HCL 180; 240 MG/1; MG/1
1 TABLET, EXTENDED RELEASE ORAL DAILY
COMMUNITY

## 2024-09-30 RX ORDER — ACETAMINOPHEN AND CODEINE PHOSPHATE 120; 12 MG/5ML; MG/5ML
1 SOLUTION ORAL DAILY
Qty: 84 TABLET | Refills: 0 | Status: SHIPPED | OUTPATIENT
Start: 2024-09-30

## 2024-09-30 NOTE — PROGRESS NOTES
"Subjective     History of Present Illness  Anupama Arnold is a 44 y.o.  female is being seen today for   Chief Complaint   Patient presents with    Follow-up     Gyn f/u- Irregular bleeding     C/o irregular bleeding x 1 month. Reports she had spotting for 14 days last month after her period. Spotting occurred when wiping and running, was dark red in color like old blood. Menses are typically regular occurring every 28 days and lasting 7 days. Heavy flow, changing pad every few hours. At the end of her current menses. Not on birth control. States it previously made her nauseous when she took it in her 20s. Not a smoker. States her mother went through menopause in early 40s.   No vaginal itching or vaginal discharge. No burning with urination. No new sex partners.   Had previously thought of ablation, but not currently interested in surgery.  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     The following portions of the patient's history were reviewed and updated as appropriate: allergies, current medications, past family history, past medical history, past social history, past surgical history and problem list.    /72   Ht 165.1 cm (65\")   Wt 69.8 kg (153 lb 12.8 oz)   LMP 2024 (Exact Date)   BMI 25.59 kg/m²         Review of Systems   Constitutional: Negative.    HENT: Negative.     Eyes: Negative.    Respiratory: Negative.     Cardiovascular: Negative.    Gastrointestinal: Negative.    Endocrine: Negative.    Genitourinary:  Positive for menstrual problem and vaginal bleeding.   Musculoskeletal: Negative.    Skin: Negative.    Allergic/Immunologic: Negative.    Neurological: Negative.    Hematological: Negative.    Psychiatric/Behavioral: Negative.         Objective   Physical Exam  Constitutional:       General: She is not in acute distress.  Cardiovascular:      Rate and Rhythm: Normal rate " and regular rhythm.      Pulses: Normal pulses.      Heart sounds: Normal heart sounds.   Pulmonary:      Effort: Pulmonary effort is normal.      Breath sounds: Normal breath sounds.   Genitourinary:     Exam position: Lithotomy position.      Labia:         Right: No rash, tenderness or lesion.         Left: No rash, tenderness or lesion.       Vagina: Normal.      Cervix: Normal.      Uterus: Normal.       Adnexa: Right adnexa normal and left adnexa normal.   Neurological:      Mental Status: She is alert and oriented to person, place, and time.   Psychiatric:         Mood and Affect: Mood normal.         Behavior: Behavior normal.         Thought Content: Thought content normal.         Judgment: Judgment normal.           Assessment & Plan   Diagnoses and all orders for this visit:    1. Irregular bleeding (Primary)  -     POC Pregnancy, Urine  -     NuSwab VG+ - Swab, Vagina    2. Menorrhagia with regular cycle  -     NuSwab VG+ - Swab, Vagina  -     Follicle Stimulating Hormone  -     TSH Rfx On Abnormal To Free T4  -     CBC & Differential    3. Encounter for initial prescription of contraceptive pills  -     norethindrone (MICRONOR) 0.35 MG tablet; Take 1 tablet by mouth Daily.  Dispense: 84 tablet; Refill: 0    -Urine pregnancy test negative.  -Nuswab VG+ completed today, Will call patient with results and treat accordingly.  -TSH, FSH, and CBC labs completed today, Will call patient with results and treat accordingly.  -Reviewed all contraception options to help with heavy menstrual bleeding.  Patient would like to continue with progesterone only pill.  Risk, benefits, pill schedule reviewed.  Instructed patient to start pills the Sunday after menses, take the pill around the same time each day.  Continue using condoms as backup contraception for the first month.  Micronor prescription sent to pharmacy.      All questions answered. Patient verbalizes understanding and is agreeable to plan.  Return in  about 3 months (around 12/30/2024) for medication f/u.

## 2024-10-01 LAB
BASOPHILS # BLD AUTO: 0.04 10*3/MM3 (ref 0–0.2)
BASOPHILS NFR BLD AUTO: 0.8 % (ref 0–1.5)
EOSINOPHIL # BLD AUTO: 0.1 10*3/MM3 (ref 0–0.4)
EOSINOPHIL NFR BLD AUTO: 1.9 % (ref 0.3–6.2)
ERYTHROCYTE [DISTWIDTH] IN BLOOD BY AUTOMATED COUNT: 12.4 % (ref 12.3–15.4)
FSH SERPL-ACNC: 8.6 MIU/ML
HCT VFR BLD AUTO: 40.3 % (ref 34–46.6)
HGB BLD-MCNC: 12.8 G/DL (ref 12–15.9)
IMM GRANULOCYTES # BLD AUTO: 0.01 10*3/MM3 (ref 0–0.05)
IMM GRANULOCYTES NFR BLD AUTO: 0.2 % (ref 0–0.5)
LYMPHOCYTES # BLD AUTO: 1.91 10*3/MM3 (ref 0.7–3.1)
LYMPHOCYTES NFR BLD AUTO: 36.2 % (ref 19.6–45.3)
MCH RBC QN AUTO: 31.5 PG (ref 26.6–33)
MCHC RBC AUTO-ENTMCNC: 31.8 G/DL (ref 31.5–35.7)
MCV RBC AUTO: 99.3 FL (ref 79–97)
MONOCYTES # BLD AUTO: 0.31 10*3/MM3 (ref 0.1–0.9)
MONOCYTES NFR BLD AUTO: 5.9 % (ref 5–12)
NEUTROPHILS # BLD AUTO: 2.91 10*3/MM3 (ref 1.7–7)
NEUTROPHILS NFR BLD AUTO: 55 % (ref 42.7–76)
NRBC BLD AUTO-RTO: 0 /100 WBC (ref 0–0.2)
PLATELET # BLD AUTO: 290 10*3/MM3 (ref 140–450)
RBC # BLD AUTO: 4.06 10*6/MM3 (ref 3.77–5.28)
TSH SERPL DL<=0.005 MIU/L-ACNC: 1.04 UIU/ML (ref 0.27–4.2)
WBC # BLD AUTO: 5.28 10*3/MM3 (ref 3.4–10.8)

## 2024-10-02 LAB
A VAGINAE DNA VAG QL NAA+PROBE: NORMAL SCORE
BVAB2 DNA VAG QL NAA+PROBE: NORMAL SCORE
C ALBICANS DNA VAG QL NAA+PROBE: NEGATIVE
C GLABRATA DNA VAG QL NAA+PROBE: NEGATIVE
C TRACH DNA SPEC QL NAA+PROBE: NEGATIVE
MEGA1 DNA VAG QL NAA+PROBE: NORMAL SCORE
N GONORRHOEA DNA VAG QL NAA+PROBE: NEGATIVE
T VAGINALIS DNA VAG QL NAA+PROBE: NEGATIVE

## 2025-01-24 ENCOUNTER — TELEPHONE (OUTPATIENT)
Dept: OBSTETRICS AND GYNECOLOGY | Age: 45
End: 2025-01-24

## 2025-01-24 NOTE — TELEPHONE ENCOUNTER
Caller: Anupama Arnold    Relationship:  Self    Best call back number: [phone]      PATIENT CALLED REQUESTING TO CANCEL SAME DAY APPT.    Did the patient call AFTER the start time of their scheduled appointment?  []YES  [x]NO    Was the patient's appointment rescheduled? [x]YES  []NO

## 2025-04-08 ENCOUNTER — TELEPHONE (OUTPATIENT)
Dept: OBSTETRICS AND GYNECOLOGY | Age: 45
End: 2025-04-08
Payer: COMMERCIAL

## 2025-04-08 DIAGNOSIS — Z12.31 SCREENING MAMMOGRAM FOR BREAST CANCER: Primary | ICD-10-CM

## 2025-04-08 DIAGNOSIS — Z12.31 SCREENING MAMMOGRAM FOR BREAST CANCER: ICD-10-CM

## 2025-04-08 NOTE — TELEPHONE ENCOUNTER
Caller: Anupama Arnold    Relationship: Self    Best call back number: [phone] (home)       What orders are you requesting (i.e. lab or imaging): ANNUAL MAMMOGRAM    In what timeframe would the patient need to come in: ANYTIME    Where will you receive your lab/imaging services: [facility]    Additional notes: NEEDS ORDER FAXED THERE AND PLEASE LET HER KNOW WHEN SHE CAN CALL AND SCHEDULE.

## 2025-05-14 ENCOUNTER — OFFICE VISIT (OUTPATIENT)
Dept: OBSTETRICS AND GYNECOLOGY | Age: 45
End: 2025-05-14
Payer: COMMERCIAL

## 2025-05-14 ENCOUNTER — LAB (OUTPATIENT)
Facility: HOSPITAL | Age: 45
End: 2025-05-14
Payer: COMMERCIAL

## 2025-05-14 VITALS
DIASTOLIC BLOOD PRESSURE: 68 MMHG | HEIGHT: 65 IN | BODY MASS INDEX: 23.16 KG/M2 | SYSTOLIC BLOOD PRESSURE: 114 MMHG | WEIGHT: 139 LBS

## 2025-05-14 DIAGNOSIS — Z13.29 SCREENING FOR THYROID DISORDER: ICD-10-CM

## 2025-05-14 DIAGNOSIS — R53.1 WEAKNESS: ICD-10-CM

## 2025-05-14 DIAGNOSIS — Z13.220 SCREENING FOR CHOLESTEROL LEVEL: ICD-10-CM

## 2025-05-14 DIAGNOSIS — N92.0 MENORRHAGIA WITH REGULAR CYCLE: ICD-10-CM

## 2025-05-14 DIAGNOSIS — Z13.1 SCREENING FOR DIABETES MELLITUS: ICD-10-CM

## 2025-05-14 DIAGNOSIS — Z01.419 WELL WOMAN EXAM WITH ROUTINE GYNECOLOGICAL EXAM: Primary | ICD-10-CM

## 2025-05-14 DIAGNOSIS — Z00.00 HEALTHCARE MAINTENANCE: ICD-10-CM

## 2025-05-14 DIAGNOSIS — Z80.3 FAMILY HISTORY OF BREAST CANCER: ICD-10-CM

## 2025-05-14 LAB
ALBUMIN SERPL-MCNC: 4.6 G/DL (ref 3.5–5.2)
ALBUMIN/GLOB SERPL: 1.8 G/DL
ALP SERPL-CCNC: 40 U/L (ref 39–117)
ALT SERPL W P-5'-P-CCNC: 7 U/L (ref 1–33)
ANION GAP SERPL CALCULATED.3IONS-SCNC: 8.2 MMOL/L (ref 5–15)
AST SERPL-CCNC: 14 U/L (ref 1–32)
BASOPHILS # BLD AUTO: 0.02 10*3/MM3 (ref 0–0.2)
BASOPHILS NFR BLD AUTO: 0.4 % (ref 0–1.5)
BILIRUB SERPL-MCNC: 0.5 MG/DL (ref 0–1.2)
BUN SERPL-MCNC: 18 MG/DL (ref 6–20)
BUN/CREAT SERPL: 27.3 (ref 7–25)
CALCIUM SPEC-SCNC: 9.4 MG/DL (ref 8.6–10.5)
CHLORIDE SERPL-SCNC: 103 MMOL/L (ref 98–107)
CHOLEST SERPL-MCNC: 144 MG/DL (ref 0–200)
CO2 SERPL-SCNC: 26.8 MMOL/L (ref 22–29)
CREAT SERPL-MCNC: 0.66 MG/DL (ref 0.57–1)
DEPRECATED RDW RBC AUTO: 44.1 FL (ref 37–54)
EGFRCR SERPLBLD CKD-EPI 2021: 110.4 ML/MIN/1.73
EOSINOPHIL # BLD AUTO: 0.07 10*3/MM3 (ref 0–0.4)
EOSINOPHIL NFR BLD AUTO: 1.3 % (ref 0.3–6.2)
ERYTHROCYTE [DISTWIDTH] IN BLOOD BY AUTOMATED COUNT: 12.3 % (ref 12.3–15.4)
FERRITIN SERPL-MCNC: 44.7 NG/ML (ref 13–150)
FOLATE SERPL-MCNC: 14.8 NG/ML (ref 4.78–24.2)
GLOBULIN UR ELPH-MCNC: 2.6 GM/DL
GLUCOSE SERPL-MCNC: 84 MG/DL (ref 65–99)
HBA1C MFR BLD: 5.2 % (ref 4.8–5.6)
HCT VFR BLD AUTO: 38.6 % (ref 34–46.6)
HDLC SERPL-MCNC: 59 MG/DL (ref 40–60)
HGB BLD-MCNC: 12.8 G/DL (ref 12–15.9)
IMM GRANULOCYTES # BLD AUTO: 0 10*3/MM3 (ref 0–0.05)
IMM GRANULOCYTES NFR BLD AUTO: 0 % (ref 0–0.5)
LDLC SERPL CALC-MCNC: 75 MG/DL (ref 0–100)
LDLC/HDLC SERPL: 1.29 {RATIO}
LYMPHOCYTES # BLD AUTO: 1.62 10*3/MM3 (ref 0.7–3.1)
LYMPHOCYTES NFR BLD AUTO: 31.2 % (ref 19.6–45.3)
MCH RBC QN AUTO: 32.2 PG (ref 26.6–33)
MCHC RBC AUTO-ENTMCNC: 33.2 G/DL (ref 31.5–35.7)
MCV RBC AUTO: 97.2 FL (ref 79–97)
MONOCYTES # BLD AUTO: 0.33 10*3/MM3 (ref 0.1–0.9)
MONOCYTES NFR BLD AUTO: 6.3 % (ref 5–12)
NEUTROPHILS NFR BLD AUTO: 3.16 10*3/MM3 (ref 1.7–7)
NEUTROPHILS NFR BLD AUTO: 60.8 % (ref 42.7–76)
NRBC BLD AUTO-RTO: 0 /100 WBC (ref 0–0.2)
PLATELET # BLD AUTO: 298 10*3/MM3 (ref 140–450)
PMV BLD AUTO: 10.3 FL (ref 6–12)
POTASSIUM SERPL-SCNC: 4.1 MMOL/L (ref 3.5–5.2)
PROT SERPL-MCNC: 7.2 G/DL (ref 6–8.5)
RBC # BLD AUTO: 3.97 10*6/MM3 (ref 3.77–5.28)
SODIUM SERPL-SCNC: 138 MMOL/L (ref 136–145)
TRIGL SERPL-MCNC: 45 MG/DL (ref 0–150)
TSH SERPL DL<=0.05 MIU/L-ACNC: 1.42 UIU/ML (ref 0.27–4.2)
VIT B12 BLD-MCNC: >2000 PG/ML (ref 211–946)
VLDLC SERPL-MCNC: 10 MG/DL (ref 5–40)
WBC NRBC COR # BLD AUTO: 5.2 10*3/MM3 (ref 3.4–10.8)

## 2025-05-14 PROCEDURE — 83036 HEMOGLOBIN GLYCOSYLATED A1C: CPT | Performed by: PHYSICIAN ASSISTANT

## 2025-05-14 PROCEDURE — 82607 VITAMIN B-12: CPT | Performed by: PHYSICIAN ASSISTANT

## 2025-05-14 PROCEDURE — 36415 COLL VENOUS BLD VENIPUNCTURE: CPT | Performed by: PHYSICIAN ASSISTANT

## 2025-05-14 PROCEDURE — 80050 GENERAL HEALTH PANEL: CPT | Performed by: PHYSICIAN ASSISTANT

## 2025-05-14 PROCEDURE — 82746 ASSAY OF FOLIC ACID SERUM: CPT | Performed by: PHYSICIAN ASSISTANT

## 2025-05-14 PROCEDURE — 82728 ASSAY OF FERRITIN: CPT | Performed by: PHYSICIAN ASSISTANT

## 2025-05-14 PROCEDURE — 80061 LIPID PANEL: CPT | Performed by: PHYSICIAN ASSISTANT

## 2025-05-14 NOTE — PROGRESS NOTES
Subjective     Chief Complaint   Patient presents with   • Annual Exam     Annual exam : needs consult for ablation, she is has very  heavy periods.  Last pap 2024 neg/neg  M/g 2024 (scheduled in )       History of Present Illness    Anupama Arnold is a 44 y.o.  who presents for annual exam.    She is doing ok    Is dealing with heavy periods  Has noted weakness and dizziness   Has discussed an ablation with Dr Granados in the past but wasn't ready for it at that time  She does think she would like to proceed with this  Her  is an interventional cardiologist with [employer] so she requests a [facility] provider  Recommended Andreea Brown or Rochelle Case  She can also ask around for referrals    She is agreeable to [facility] labs today    Not currently using BC but not currently SA    Had the urethral bulking done recently and it does seem like it is working   She is working out and using the semaglutide (gets it from [employer])  Down approx 20 lbs now and feels that helps with her bladder function as well    She is active  Runs a lot  Needs to work on weight lifting as well    Does not have pcp and is open to referral      Has 4 children, 2 boys and 2 girls    Her menses are regular every 28-30 days, lasting >7 days, dysmenorrhea none   Obstetric History:  OB History          4    Para   4    Term   2       2    AB   0    Living   4         SAB   0    IAB   0    Ectopic   0    Molar        Multiple   0    Live Births   4               Menstrual History:     Patient's last menstrual period was 2025 (exact date).         Current contraception: abstinence  History of abnormal Pap smear: no  Received Gardasil immunization: no  Perform regular self breast exam: yes - occl  Family history of uterine or ovarian cancer: no  Family History of colon cancer: no  Family history of breast cancer: no    Mammogram: ordered.  Colonoscopy: recommended.  DEXA: not indicated.    Exercise: moderately  "active  Calcium/Vitamin D: adequate intake    The following portions of the patient's history were reviewed and updated as appropriate: allergies, current medications, past family history, past medical history, past social history, past surgical history, and problem list.    Review of Systems   Constitutional:  Positive for fatigue.   Genitourinary:  Positive for menstrual problem (menorrhagiat).   All other systems reviewed and are negative.      Review of Systems   Constitutional: Negative for fatigue.   Respiratory: Negative for shortness of breath.    Gastrointestinal: Negative for abdominal pain.   Genitourinary: Negative for dysuria.   Neurological: Negative for headaches.   Psychiatric/Behavioral: Negative for dysphoric mood.         Objective   Physical Exam    /68   Ht 165.1 cm (65\")   Wt 63 kg (139 lb)   LMP 04/30/2025 (Exact Date)   BMI 23.13 kg/m²   General:   alert, comfortable, and no distress   Heart: regular rate and rhythm   Lungs: clear to auscultation bilaterally   Breast: normal appearance, no masses or tenderness, Inspection negative, No nipple retraction or dimpling, No nipple discharge or bleeding, No axillary or supraclavicular adenopathy, Normal to palpation without dominant masses   Neck: no adenopathy and no carotid bruit   Abdomen: Not performed today   CVA: Not performed today   Pelvis: External genitalia: normal general appearance  Vaginal: normal mucosa without prolapse or lesions  Cervix: normal appearance  Adnexa: normal bimanual exam  Uterus: normal single, nontender   Extremities: Not performed today   Neurologic: negative   Psychiatric: Normal affect, judgement, and mood     Assessment & Plan   Diagnoses and all orders for this visit:    1. Well woman exam with routine gynecological exam (Primary)    2. Family history of breast cancer    3. Healthcare maintenance  -     Ambulatory Referral to Family Practice    4. Menorrhagia with regular cycle  -     Ambulatory Referral " to Family Practice  -     Vitamin B12  -     Folate  -     CBC & Differential  -     Ferritin    5. Weakness    6. Screening for cholesterol level  -     Lipid Panel    7. Screening for thyroid disorder  -     TSH    8. Screening for diabetes mellitus  -     Comprehensive Metabolic Panel  -     Hemoglobin A1c        All questions answered.  Breast self exam technique reviewed and patient encouraged to perform self-exam monthly.  Discussed healthy lifestyle modifications.  Recommended 30 minutes of aerobic exercise five times per week.  Discussed calcium needs to prevent osteoporosis.      Pap done    labs ordered   Referral to PCP  F/u with david to discuss ablation

## (undated) DEVICE — SUT VIC 0 CT1 36IN J946H

## (undated) DEVICE — UNDYED BRAIDED (POLYGLACTIN 910), SYNTHETIC ABSORBABLE SUTURE: Brand: COATED VICRYL

## (undated) DEVICE — SUT MNCRYL 3/0 CT1 36 IN Y944H

## (undated) DEVICE — SOL IRR H2O BTL 1000ML STRL

## (undated) DEVICE — GLV SURG BIOGEL LTX PF 6 1/2

## (undated) DEVICE — 3M™ MEDIPORE™ H SOFT CLOTH SURGICAL TAPE 2864, 4 INCH X 10 YARD (10CM X 9,14M), 12 ROLLS/CASE: Brand: 3M™ MEDIPORE™

## (undated) DEVICE — SUT MNCRYL 0/0 CTX 36IN Y398H